# Patient Record
Sex: FEMALE | Race: WHITE | Employment: OTHER | URBAN - METROPOLITAN AREA
[De-identification: names, ages, dates, MRNs, and addresses within clinical notes are randomized per-mention and may not be internally consistent; named-entity substitution may affect disease eponyms.]

---

## 2017-01-23 ENCOUNTER — MED REC SCAN ONLY (OUTPATIENT)
Dept: FAMILY MEDICINE CLINIC | Facility: CLINIC | Age: 67
End: 2017-01-23

## 2017-02-14 ENCOUNTER — TELEPHONE (OUTPATIENT)
Dept: FAMILY MEDICINE CLINIC | Facility: CLINIC | Age: 67
End: 2017-02-14

## 2017-02-14 RX ORDER — RIVASTIGMINE 4.6 MG/24H
1 PATCH, EXTENDED RELEASE TRANSDERMAL DAILY
COMMUNITY
Start: 2010-08-20 | End: 2017-07-05 | Stop reason: ALTCHOICE

## 2017-02-14 RX ORDER — CHLORHEXIDINE GLUCONATE 0.12 MG/ML
1 RINSE ORAL AS DIRECTED
COMMUNITY
Start: 2011-10-18 | End: 2019-02-25

## 2017-02-14 RX ORDER — ESCITALOPRAM OXALATE 20 MG/1
1 TABLET ORAL DAILY
COMMUNITY
Start: 2013-09-20 | End: 2019-08-21

## 2017-02-14 RX ORDER — DIVALPROEX SODIUM 500 MG/1
1 TABLET, DELAYED RELEASE ORAL 2 TIMES DAILY
COMMUNITY
Start: 2012-10-23 | End: 2020-02-11

## 2017-02-14 RX ORDER — LITHIUM CARBONATE 300 MG/1
1 CAPSULE ORAL DAILY
COMMUNITY
Start: 2014-10-22 | End: 2017-07-26

## 2017-02-14 RX ORDER — ACETAMINOPHEN 325 MG/1
2 TABLET ORAL 2 TIMES DAILY
COMMUNITY
Start: 2010-02-03 | End: 2020-05-01

## 2017-02-14 RX ORDER — DIAPER,BRIEF,INFANT-TODD,DISP
1 EACH MISCELLANEOUS AS DIRECTED
COMMUNITY
Start: 2010-08-20 | End: 2020-05-01

## 2017-02-14 RX ORDER — RANITIDINE 150 MG/1
1 TABLET ORAL
COMMUNITY
Start: 2010-02-03 | End: 2018-08-02

## 2017-02-14 RX ORDER — DIAZEPAM 10 MG/1
1 TABLET ORAL AS DIRECTED
COMMUNITY
Start: 2010-02-03 | End: 2017-09-07

## 2017-02-14 RX ORDER — DIVALPROEX SODIUM 250 MG/1
1 TABLET, DELAYED RELEASE ORAL 2 TIMES DAILY
COMMUNITY
Start: 2014-02-18 | End: 2017-07-05 | Stop reason: ALTCHOICE

## 2017-02-14 RX ORDER — GUAIFENESIN 100 MG/5ML
10 SYRUP ORAL AS DIRECTED
COMMUNITY
Start: 2010-02-03 | End: 2020-05-01

## 2017-02-14 RX ORDER — L-DESOXYEPHEDRINE 50 MG
1 INHALER (EA) NASAL 4 TIMES DAILY PRN
COMMUNITY
Start: 2015-10-13 | End: 2020-05-01

## 2017-02-14 NOTE — TELEPHONE ENCOUNTER
Per Liseth De Jesus-  Patient has no appetite/not wanting to eat/weak/spacey/dementia increased. Questions restarting Hospice? Please advise.   Ludy Pineda, 02/14/2017, 11:37 AM

## 2017-02-14 NOTE — TELEPHONE ENCOUNTER
Nisa informed of appt needed. Appt given 11am Wed 12/15 with Dr Marilynn Alcantar for  Hospice Evaluation.   Kali Babin, 02/14/2017, 3:22 PM

## 2017-02-15 NOTE — PROGRESS NOTES
[facility] [place]  PROGRESS NOTE  Chief Complaint:   Patient presents with:  Consult: Hospice Consult      HPI:   This is a 77year old female coming in for consultation regarding hospice care.   The staff at [address] [place] [address] is noted progressive decline for Diarrhea Max 3 Days Disp:  Rfl:    Camphor-Eucalyptus-Menthol (VICKS VAPORUB) 4.7-1.2-2.6 % External Ointment Apply 1 Application topically 4 (four) times daily as needed. Apply thin layer to chest for congestion.  Limit 6 days Disp:  Rfl:    Chlorhexid sneezing, congestion, runny nose or sore throat. INTEGUMENTARY:  Denies rashes, itching, skin lesion, or excessive skin dryness.   CARDIOVASCULAR:  Denies chest pain, chest pressure, chest discomfort, palpitations, edema, dyspnea on exertion or at rest.  R rhythm, no murmurs, rubs or gallops. LUNGS: Clear to auscultation bilterally, no rales/rhonchi/wheezing. CHEST: No tenderness. ABDOMEN: Soft and mildly distended. There is no guarding or rebound tenderness noted. There is no significant mass palpated. any additional medications at this time. Meds & Refills for this Visit:  No prescriptions requested or ordered in this encounter       Patient/Caregiver Education: Patient/Caregiver Education: There are no barriers to learning. Medical education done.

## 2017-02-24 ENCOUNTER — TELEPHONE (OUTPATIENT)
Dept: FAMILY MEDICINE CLINIC | Facility: CLINIC | Age: 67
End: 2017-02-24

## 2017-02-24 NOTE — TELEPHONE ENCOUNTER
Dr Ac Man called back. He wondered about the Lithium for Adrianne because her last level was low. I told him that she is now in Hospice care and declining. I do not recommend increasing her Lithium dose at this time. He agreed.

## 2017-02-28 ENCOUNTER — TELEPHONE (OUTPATIENT)
Dept: FAMILY MEDICINE CLINIC | Facility: CLINIC | Age: 67
End: 2017-02-28

## 2017-03-03 ENCOUNTER — MED REC SCAN ONLY (OUTPATIENT)
Dept: FAMILY MEDICINE CLINIC | Facility: CLINIC | Age: 67
End: 2017-03-03

## 2017-04-18 ENCOUNTER — MED REC SCAN ONLY (OUTPATIENT)
Dept: FAMILY MEDICINE CLINIC | Facility: CLINIC | Age: 67
End: 2017-04-18

## 2017-05-09 ENCOUNTER — MED REC SCAN ONLY (OUTPATIENT)
Dept: FAMILY MEDICINE CLINIC | Facility: CLINIC | Age: 67
End: 2017-05-09

## 2017-05-12 ENCOUNTER — TELEPHONE (OUTPATIENT)
Dept: FAMILY MEDICINE CLINIC | Facility: CLINIC | Age: 67
End: 2017-05-12

## 2017-05-12 NOTE — TELEPHONE ENCOUNTER
Per Tc Vázquez-  Patient has had occasional blood in urine x2 weeks. No U/A done. Otherwise doing well. Please advise.   Gabrielle Britt, 05/12/2017, 11:43 AM

## 2017-05-26 ENCOUNTER — TELEPHONE (OUTPATIENT)
Dept: FAMILY MEDICINE CLINIC | Facility: CLINIC | Age: 67
End: 2017-05-26

## 2017-05-26 NOTE — TELEPHONE ENCOUNTER
[facility] with hospice calling with an update on patient for Dr. Perla Gomez. States patient has always had intermittent bleeding either from her vagina or in her urine but they are unsure which one.   States last week the bleeding was so bad that she actually ble

## 2017-05-26 NOTE — TELEPHONE ENCOUNTER
Nurse Pam Bravo informed of the below recommendations and orders.  Galeana Lipoma, 05/26/2017, 1:20 PM

## 2017-05-31 ENCOUNTER — TELEPHONE (OUTPATIENT)
Dept: FAMILY MEDICINE CLINIC | Facility: CLINIC | Age: 67
End: 2017-05-31

## 2017-05-31 NOTE — TELEPHONE ENCOUNTER
Orders faxed to [address] [place] [address] at [phone] for Hydrocortisone 1% cream.   Orders faxed to [facility] at [phone] for gloves, underpads and incontinent supplies.

## 2017-06-08 ENCOUNTER — PATIENT OUTREACH (OUTPATIENT)
Dept: FAMILY MEDICINE CLINIC | Facility: CLINIC | Age: 67
End: 2017-06-08

## 2017-06-09 ENCOUNTER — TELEPHONE (OUTPATIENT)
Dept: FAMILY MEDICINE CLINIC | Facility: CLINIC | Age: 67
End: 2017-06-09
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
## 2017-06-20 ENCOUNTER — MED REC SCAN ONLY (OUTPATIENT)
Dept: FAMILY MEDICINE CLINIC | Facility: CLINIC | Age: 67
End: 2017-06-20

## 2017-06-29 ENCOUNTER — TELEPHONE (OUTPATIENT)
Dept: FAMILY MEDICINE CLINIC | Facility: CLINIC | Age: 67
End: 2017-06-29

## 2017-06-30 NOTE — TELEPHONE ENCOUNTER
I spoke with Darryl Mazariegos about Adrianne. She continues to have bleeding but is otherwise unchanged. She has cataracts - should she stay in hospice? P:  Will be here for a vist July 5; will discuss this more then.

## 2017-07-05 ENCOUNTER — TELEPHONE (OUTPATIENT)
Dept: FAMILY MEDICINE CLINIC | Facility: CLINIC | Age: 67
End: 2017-07-05

## 2017-07-05 ENCOUNTER — OFFICE VISIT (OUTPATIENT)
Dept: FAMILY MEDICINE CLINIC | Facility: CLINIC | Age: 67
End: 2017-07-05

## 2017-07-05 VITALS — HEART RATE: 50 BPM | TEMPERATURE: 99 F | DIASTOLIC BLOOD PRESSURE: 62 MMHG | SYSTOLIC BLOOD PRESSURE: 108 MMHG

## 2017-07-05 DIAGNOSIS — F72 SEVERE MENTAL RETARDATION: ICD-10-CM

## 2017-07-05 DIAGNOSIS — K64.4 HEMORRHOIDS, EXTERNAL: ICD-10-CM

## 2017-07-05 DIAGNOSIS — R63.4 WEIGHT LOSS: ICD-10-CM

## 2017-07-05 DIAGNOSIS — F02.81 LATE ONSET ALZHEIMER'S DISEASE WITH BEHAVIORAL DISTURBANCE (HCC): ICD-10-CM

## 2017-07-05 DIAGNOSIS — D41.4 NEOPLASM OF UNCERTAIN BEHAVIOR OF BLADDER: ICD-10-CM

## 2017-07-05 DIAGNOSIS — Z90.710 STATUS POST HYSTERECTOMY: ICD-10-CM

## 2017-07-05 DIAGNOSIS — Z00.00 ENCOUNTER FOR ANNUAL HEALTH EXAMINATION: Primary | ICD-10-CM

## 2017-07-05 DIAGNOSIS — G30.1 LATE ONSET ALZHEIMER'S DISEASE WITH BEHAVIORAL DISTURBANCE (HCC): ICD-10-CM

## 2017-07-05 DIAGNOSIS — F31.9 BIPOLAR I DISORDER (HCC): ICD-10-CM

## 2017-07-05 DIAGNOSIS — R56.9 CONVULSIONS, UNSPECIFIED CONVULSION TYPE (HCC): ICD-10-CM

## 2017-07-05 PROCEDURE — 99214 OFFICE O/P EST MOD 30 MIN: CPT | Performed by: FAMILY MEDICINE

## 2017-07-05 PROCEDURE — G0439 PPPS, SUBSEQ VISIT: HCPCS | Performed by: FAMILY MEDICINE

## 2017-07-05 NOTE — TELEPHONE ENCOUNTER
I called and spoke with Suzi Hwang. We will take Adrianne out of Hospice care. She has cataracts - the family will consider surgery for those.

## 2017-07-05 NOTE — PROGRESS NOTES
[facility] [place]  PROGRESS NOTE  Chief Complaint:   Patient presents with:  Physical      HPI:   This is a 77year old female coming in for annual Medicare wellness exam.    She has been receiving hospice care because of her declining conditio Camphor-Eucalyptus-Menthol (VICKS VAPORUB) 4.7-1.2-2.6 % External Ointment Apply 1 Application topically 4 (four) times daily as needed. Apply thin layer to chest for congestion.  Limit 6 days Disp:  Rfl:    Chlorhexidine Gluconate (PERIDEX) 0.12 % Mouth/ at rest.  RESPIRATORY:  Denies shortness of breath, wheezing, cough or sputum. GASTROINTESTINAL:  Denies abdominal pain, nausea, vomiting, constipation, diarrhea, or blood in stool.   MUSCULOSKELETAL:  Denies weakness, muscle aches, back pain, joint pain, quadrants, no masses, no hepatosplenomegaly. Pelvic: External genitalia are normal.  A very gentle single finger exam was done of the vagina. No masses were noted. There is no blood noted.   Rectal: She has multiple large external hemorrhoids that are no with the dementia. Meds & Refills for this Visit:  No prescriptions requested or ordered in this encounter       Patient/Caregiver Education: Patient/Caregiver Education: There are no barriers to learning. Medical education done.    Outcome: Patient ve

## 2017-07-05 NOTE — PATIENT INSTRUCTIONS
Pelvic exam performed today. Adrianne does not have an obvious internal vaginal source of bleeding. She does not have an obvious mass in her pelvis. She does have external hemorrhoids.     Plan:  I recommend that we discontinue hospice services at this t

## 2017-07-12 ENCOUNTER — MED REC SCAN ONLY (OUTPATIENT)
Dept: FAMILY MEDICINE CLINIC | Facility: CLINIC | Age: 67
End: 2017-07-12

## 2017-07-24 ENCOUNTER — TELEPHONE (OUTPATIENT)
Dept: FAMILY MEDICINE CLINIC | Facility: CLINIC | Age: 67
End: 2017-07-24

## 2017-07-24 NOTE — TELEPHONE ENCOUNTER
New Rx sent in for Hydrocortisone cream - apply bid as needed for redness, itching, or inflammation.

## 2017-07-24 NOTE — TELEPHONE ENCOUNTER
Fax asking if something should be done for Paitent's external hemmorhoids? Staff has concerns. Please advise.

## 2017-07-26 ENCOUNTER — TELEPHONE (OUTPATIENT)
Dept: FAMILY MEDICINE CLINIC | Facility: CLINIC | Age: 67
End: 2017-07-26

## 2017-07-26 DIAGNOSIS — E05.90 HYPERTHYROIDISM: Primary | ICD-10-CM

## 2017-07-26 NOTE — TELEPHONE ENCOUNTER
Please call Ritesh Recio. Adrianne's labs are abnormal.   Her TSH level is very low. This is probably caused by her Lithium. Please discontinue her Lithium and do the additional labs that have been ordered.   She may need some other tests depending on these n

## 2017-08-08 LAB — AMB EXT TSH: 0.01 MIU/ML

## 2017-08-09 ENCOUNTER — OFFICE VISIT (OUTPATIENT)
Dept: FAMILY MEDICINE CLINIC | Facility: CLINIC | Age: 67
End: 2017-08-09

## 2017-08-09 VITALS
DIASTOLIC BLOOD PRESSURE: 70 MMHG | HEIGHT: 63 IN | TEMPERATURE: 98 F | SYSTOLIC BLOOD PRESSURE: 128 MMHG | RESPIRATION RATE: 16 BRPM | WEIGHT: 132 LBS | BODY MASS INDEX: 23.39 KG/M2 | HEART RATE: 72 BPM

## 2017-08-09 DIAGNOSIS — F41.1 GENERALIZED ANXIETY DISORDER: ICD-10-CM

## 2017-08-09 DIAGNOSIS — F72 SEVERE MENTAL RETARDATION: ICD-10-CM

## 2017-08-09 DIAGNOSIS — R56.9 CONVULSIONS, UNSPECIFIED CONVULSION TYPE (HCC): ICD-10-CM

## 2017-08-09 DIAGNOSIS — F60.3 EXPLOSIVE PERSONALITY DISORDER (HCC): ICD-10-CM

## 2017-08-09 DIAGNOSIS — H25.813 COMBINED FORMS OF AGE-RELATED CATARACT OF BOTH EYES: Primary | ICD-10-CM

## 2017-08-09 DIAGNOSIS — L28.0 LICHENIFICATION AND LICHEN SIMPLEX CHRONICUS: ICD-10-CM

## 2017-08-09 DIAGNOSIS — G30.1 LATE ONSET ALZHEIMER'S DISEASE WITH BEHAVIORAL DISTURBANCE (HCC): ICD-10-CM

## 2017-08-09 DIAGNOSIS — Z01.818 PREOP EXAMINATION: ICD-10-CM

## 2017-08-09 DIAGNOSIS — F02.81 LATE ONSET ALZHEIMER'S DISEASE WITH BEHAVIORAL DISTURBANCE (HCC): ICD-10-CM

## 2017-08-09 PROCEDURE — 93000 ELECTROCARDIOGRAM COMPLETE: CPT | Performed by: FAMILY MEDICINE

## 2017-08-09 PROCEDURE — 99214 OFFICE O/P EST MOD 30 MIN: CPT | Performed by: FAMILY MEDICINE

## 2017-08-09 NOTE — PROGRESS NOTES
[facility] [place]  PROGRESS NOTE  Chief Complaint:   Patient presents with:  Physical: Annual and PreOp      HPI:   This is a 79year old female coming in for a preoperative examination for cataract surgery.   This is also her Medicare annual we daily. For Minor Pain/Fever above 100(Max 3 days.)  Disp:  Rfl:    bismuth subsalicylate (BISMATROL) 262 MG/15ML Oral Suspension Take 30 mL by mouth As Directed. Q4hrs/prn for Diarrhea Max 3 Days Disp:  Rfl:    Camphor-Eucalyptus-Menthol (VICKS VAPORUB) 4. excoriated areas on her face and on her legs. CARDIOVASCULAR:  Denies chest pain, chest pressure, chest discomfort, palpitations, edema, dyspnea on exertion or at rest.  RESPIRATORY:  Denies shortness of breath, wheezing, cough or sputum.   Keshia Euceda rales/rhonchi/wheezing. ABDOMEN:  Soft, nondistended, nontender, bowel sounds normal in all 4 quadrants, no masses, no hepatosplenomegaly. EXTREMITIES:  No edema, no cyanosis, no clubbing, FROM, 2+ dorsalis pedis pulses bilaterally.   NEURO: No focal defi ordered in this encounter      Patient/Caregiver Education: Patient/Caregiver Education: There are no barriers to learning. Medical education done. Outcome: Patient verbalizes understanding.  Patient is notified to call with any questions, complications,

## 2017-08-09 NOTE — PATIENT INSTRUCTIONS
Medically clear for cataract surgery. She has progressively more anxiety as evidenced by outbursts and scratching.   However, the medications we have used for this in the past have caused significant side effects (Risperdal - sleepiness, Lithium - thyroid

## 2017-08-14 ENCOUNTER — TELEPHONE (OUTPATIENT)
Dept: FAMILY MEDICINE CLINIC | Facility: CLINIC | Age: 67
End: 2017-08-14

## 2017-08-14 NOTE — TELEPHONE ENCOUNTER
Please advise fax received from [address] [place] [address].   Marcella Schneider, 08/14/17, 9:18 AM

## 2017-08-14 NOTE — TELEPHONE ENCOUNTER
Tamara's anxiety seems to be increasing now that she is off lithium. Now that she is no longer receiving hospice care she is eligible to go back to seeing the psychiatrist.  The request of the staff is that MARKND start seeing the psychiatrist again.   I

## 2017-08-16 ENCOUNTER — TELEPHONE (OUTPATIENT)
Dept: FAMILY MEDICINE CLINIC | Facility: CLINIC | Age: 67
End: 2017-08-16

## 2017-08-23 ENCOUNTER — TELEPHONE (OUTPATIENT)
Dept: FAMILY MEDICINE CLINIC | Facility: CLINIC | Age: 67
End: 2017-08-23

## 2017-08-23 DIAGNOSIS — E05.90 HYPERTHYROIDISM: Primary | ICD-10-CM

## 2017-08-23 NOTE — TELEPHONE ENCOUNTER
Labs reviewed. Her TSH is low at 0.01. However her free T4 is normal at 1.22. Her TPO antibody is normal 1.3. Her thyroid globulin antibody is normal at 14. This is consistent with an elevated TSH associated with lithium.   However since her free T4 is

## 2017-08-29 ENCOUNTER — TELEPHONE (OUTPATIENT)
Dept: FAMILY MEDICINE CLINIC | Facility: CLINIC | Age: 67
End: 2017-08-29

## 2017-08-29 NOTE — TELEPHONE ENCOUNTER
Adrianne had her cataract surgery and did very well. She is scheduled to have the other eye done on September 11. She needs a note stating that she remains medically clear for surgery. She is not having any signs or symptoms of infection.     This serves

## 2017-08-30 ENCOUNTER — TELEPHONE (OUTPATIENT)
Dept: FAMILY MEDICINE CLINIC | Facility: CLINIC | Age: 67
End: 2017-08-30

## 2017-08-30 NOTE — TELEPHONE ENCOUNTER
Pt had pre op px cataract on 8/9/17 but second eye surgery was not scheduled. Pt is now having the second cataract done on 9/11 but px is 2 days over the 30 day recommended time for pre op px.  Does pt need another px or can you amended the original px so p

## 2017-08-30 NOTE — TELEPHONE ENCOUNTER
H and P is not within the 30 day time frame that it needs to be- suggesting nurse to call [facility] to see what kind of addendum needs to be done in order for them to accept this- person to contact is [address] at [phone]

## 2017-08-30 NOTE — TELEPHONE ENCOUNTER
Please see phone note from yesterday. Note from yesterday contains addendum to original H & P; no new visit needed.

## 2017-09-05 ENCOUNTER — TELEPHONE (OUTPATIENT)
Dept: FAMILY MEDICINE CLINIC | Facility: CLINIC | Age: 67
End: 2017-09-05

## 2017-09-05 NOTE — TELEPHONE ENCOUNTER
Has Fx wrist and seeing Dr [facility]. Having Eye surgery on 9/11/17 and needs clearance.

## 2017-09-06 NOTE — TELEPHONE ENCOUNTER
Per Carri Session-  Needing addendum to H&P for Cataract Surgery. Since office visit with Dr Deonte Sultana Patient has left wrist fracture. Casted Today by Dr Kate Willett. Patient also has skin breakdown to bottom. Needing evaluation to clear for surgery.     Kirsty

## 2017-09-07 ENCOUNTER — TELEPHONE (OUTPATIENT)
Dept: FAMILY MEDICINE CLINIC | Facility: CLINIC | Age: 67
End: 2017-09-07

## 2017-09-07 ENCOUNTER — OFFICE VISIT (OUTPATIENT)
Dept: FAMILY MEDICINE CLINIC | Facility: CLINIC | Age: 67
End: 2017-09-07

## 2017-09-07 VITALS
DIASTOLIC BLOOD PRESSURE: 72 MMHG | HEART RATE: 68 BPM | SYSTOLIC BLOOD PRESSURE: 110 MMHG | TEMPERATURE: 99 F | WEIGHT: 131.25 LBS | RESPIRATION RATE: 18 BRPM | BODY MASS INDEX: 23 KG/M2

## 2017-09-07 DIAGNOSIS — Z01.818 PREOPERATIVE EXAMINATION: ICD-10-CM

## 2017-09-07 DIAGNOSIS — H25.9 SENILE CATARACT OF LEFT EYE, UNSPECIFIED AGE-RELATED CATARACT TYPE: ICD-10-CM

## 2017-09-07 DIAGNOSIS — L89.151 DECUBITUS ULCER OF SACRAL REGION, STAGE 1: Primary | ICD-10-CM

## 2017-09-07 PROCEDURE — 99213 OFFICE O/P EST LOW 20 MIN: CPT | Performed by: FAMILY MEDICINE

## 2017-09-07 NOTE — PATIENT INSTRUCTIONS
Recommend to apply ointment twice a day for 4 weeks. Return to clinic if any increase redness, pain, warmth, fever or oozing. May proceed with cataract procedure. She does not have any contraindication for procedure. EKG was done last month.    Recheck

## 2017-09-07 NOTE — PROGRESS NOTES
[address]  PROGRESS NOTE  Chief Complaint:   Patient presents with:  Lesion: breakdown on buttocks, open sore, about 2 weeks ago, vaseline.    Surgical/procedure Clearance: needs clearance loulou carmen cataract procedure on 9/11/17 Apply to Perineum Area Every Night before putting diaper on. Disp:  Rfl:    acetaminophen 325 MG Oral Tab Take 650 mg by mouth every 6 (six) hours as needed for Pain or Fever (Pain or Fever >100).  Disp:  Rfl:    acetaminophen 325 MG Oral Tab Take 2 tablets nose or sore throat. INTEGUMENTARY: See HPI  CARDIOVASCULAR:  Denies chest pain, chest pressure, chest discomfort, palpitations, edema, dyspnea on exertion or at rest.  RESPIRATORY:  Denies shortness of breath, wheezing, cough or sputum.   GASTROINTESTINAL twice a day for 4 weeks. Return to clinic if any increase redness, pain, warmth, fever or oozing. May proceed with cataract procedure. She does not have any contraindication for procedure. EKG was done last month.    Recheck in 2 weeks with Dr Lorie Pendleton

## 2017-09-19 ENCOUNTER — TELEPHONE (OUTPATIENT)
Dept: FAMILY MEDICINE CLINIC | Facility: CLINIC | Age: 67
End: 2017-09-19

## 2017-09-19 DIAGNOSIS — R56.9 CONVULSIONS, UNSPECIFIED CONVULSION TYPE (HCC): ICD-10-CM

## 2017-09-19 DIAGNOSIS — R53.81 GENERAL BODY DETERIORATION: Primary | ICD-10-CM

## 2017-09-19 DIAGNOSIS — F02.81 LATE ONSET ALZHEIMER'S DISEASE WITH BEHAVIORAL DISTURBANCE (HCC): ICD-10-CM

## 2017-09-19 DIAGNOSIS — R53.1 WEAKNESS: ICD-10-CM

## 2017-09-19 DIAGNOSIS — G30.1 LATE ONSET ALZHEIMER'S DISEASE WITH BEHAVIORAL DISTURBANCE (HCC): ICD-10-CM

## 2017-09-20 NOTE — TELEPHONE ENCOUNTER
Good news. Adrianne's thyroid is coming back to normal.  No new medications now. Recheck her TSH in 3 months.

## 2017-09-20 NOTE — TELEPHONE ENCOUNTER
Information faxed to [address] [place] [address].   [facility], 09/20/17, 5:20 PM

## 2017-09-22 ENCOUNTER — OFFICE VISIT (OUTPATIENT)
Dept: FAMILY MEDICINE CLINIC | Facility: CLINIC | Age: 67
End: 2017-09-22

## 2017-09-22 ENCOUNTER — MED REC SCAN ONLY (OUTPATIENT)
Dept: FAMILY MEDICINE CLINIC | Facility: CLINIC | Age: 67
End: 2017-09-22

## 2017-09-22 VITALS
SYSTOLIC BLOOD PRESSURE: 120 MMHG | HEIGHT: 63 IN | HEART RATE: 62 BPM | DIASTOLIC BLOOD PRESSURE: 74 MMHG | TEMPERATURE: 98 F | BODY MASS INDEX: 23.21 KG/M2 | WEIGHT: 131 LBS

## 2017-09-22 DIAGNOSIS — L89.152 DECUBITUS ULCER OF SACRAL REGION, STAGE 2 (HCC): Primary | ICD-10-CM

## 2017-09-22 PROBLEM — Z01.818 PREOP EXAMINATION: Status: RESOLVED | Noted: 2017-08-09 | Resolved: 2017-09-22

## 2017-09-22 PROCEDURE — 99213 OFFICE O/P EST LOW 20 MIN: CPT | Performed by: FAMILY MEDICINE

## 2017-09-22 RX ORDER — DIFLUPREDNATE 0.5 MG/ML
1 EMULSION OPHTHALMIC 3 TIMES DAILY
COMMUNITY
End: 2018-08-14 | Stop reason: ALTCHOICE

## 2017-09-22 NOTE — PROGRESS NOTES
[address]  PROGRESS NOTE  Chief Complaint:   Patient presents with: Follow - Up      HPI:   This is a 79year old female coming in for follow-up on the sore on her bottom.     Miranda Tay seems to be a new woman with the change in her medic External Ointment Apply 1 Application topically nightly. Apply to Perineum Area Every Night before putting diaper on. Disp:  Rfl:    acetaminophen 325 MG Oral Tab Take 650 mg by mouth every 6 (six) hours as needed for Pain or Fever (Pain or Fever >100).  Leopold Martens seems to have much better vision since her cataract surgery. She now looks at people and seems to have much more eye contact with faces. Ears, Nose, Throat:  Denies hearing loss, sneezing, congestion, runny nose or sore throat.   INTEGUMENTARY: The staff Throat:  No tonsillar erythema or exudate. Mouth:  No oral lesions or ulcerations, good dentition. NECK: Supple, no CLAD, no JVD, no thyromegaly. SKIN: She has stage I decubitus on her right sacral area just to the right to the midline.   It is not open Convulsions ([facility] [address]     General body deterioration     Weight loss     Hemorrhoids, external     Decubitus ulcer of sacral region, stage 2      Klever Weldon MD  9/22/2017  3:12 PM

## 2017-10-11 ENCOUNTER — MED REC SCAN ONLY (OUTPATIENT)
Dept: FAMILY MEDICINE CLINIC | Facility: CLINIC | Age: 67
End: 2017-10-11

## 2017-12-12 ENCOUNTER — MED REC SCAN ONLY (OUTPATIENT)
Dept: FAMILY MEDICINE CLINIC | Facility: CLINIC | Age: 67
End: 2017-12-12

## 2017-12-16 NOTE — PROGRESS NOTES
This note is an addendum to the progress note of 9/22/2017. This addendum is added on December 16, 2017. Adrianne has a medical condition which requires positioning of the body in ways not feasible with an ordinary bed.   She has a decubitus ulcer on he

## 2018-01-02 ENCOUNTER — TELEPHONE (OUTPATIENT)
Dept: FAMILY MEDICINE CLINIC | Facility: CLINIC | Age: 68
End: 2018-01-02

## 2018-01-05 ENCOUNTER — TELEPHONE (OUTPATIENT)
Dept: FAMILY MEDICINE CLINIC | Facility: CLINIC | Age: 68
End: 2018-01-05

## 2018-01-05 NOTE — TELEPHONE ENCOUNTER
Left detailed message for Ab Burton with [facility]. Asked Anne Carson to call back with any furthering questions/concerns.

## 2018-05-09 ENCOUNTER — MED REC SCAN ONLY (OUTPATIENT)
Dept: FAMILY MEDICINE CLINIC | Facility: CLINIC | Age: 68
End: 2018-05-09

## 2018-05-09 ENCOUNTER — TELEPHONE (OUTPATIENT)
Dept: FAMILY MEDICINE CLINIC | Facility: CLINIC | Age: 68
End: 2018-05-09

## 2018-05-09 NOTE — TELEPHONE ENCOUNTER
Copy of DNR form faxed to Sonali Ladnis at [facility]. Mesha Corbin did call back to confirm fax. She will have POA sign new form.   Lucrecia Luna, 05/09/18, 5:56 PM

## 2018-06-01 ENCOUNTER — MED REC SCAN ONLY (OUTPATIENT)
Dept: FAMILY MEDICINE CLINIC | Facility: CLINIC | Age: 68
End: 2018-06-01

## 2018-08-02 NOTE — TELEPHONE ENCOUNTER
Future appt:     Your appointments     Date & Time Appointment Department [facility])    Aug 14, 2018  9:00 AM CDT Medicare Annual Well Visit with Chester Moran MD [address] [place] [address]

## 2018-08-03 RX ORDER — RANITIDINE 150 MG/1
TABLET ORAL
Qty: 31 TABLET | Refills: 11 | Status: SHIPPED | OUTPATIENT
Start: 2018-08-03 | End: 2020-02-11

## 2018-08-14 ENCOUNTER — OFFICE VISIT (OUTPATIENT)
Dept: FAMILY MEDICINE CLINIC | Facility: CLINIC | Age: 68
End: 2018-08-14
Payer: MEDICARE

## 2018-08-14 VITALS
SYSTOLIC BLOOD PRESSURE: 110 MMHG | RESPIRATION RATE: 18 BRPM | BODY MASS INDEX: 25.23 KG/M2 | HEART RATE: 82 BPM | WEIGHT: 142.38 LBS | HEIGHT: 63 IN | TEMPERATURE: 97 F | DIASTOLIC BLOOD PRESSURE: 62 MMHG

## 2018-08-14 DIAGNOSIS — F41.1 GENERALIZED ANXIETY DISORDER: ICD-10-CM

## 2018-08-14 DIAGNOSIS — I48.3 TYPICAL ATRIAL FLUTTER (HCC): ICD-10-CM

## 2018-08-14 DIAGNOSIS — F31.9 BIPOLAR I DISORDER (HCC): ICD-10-CM

## 2018-08-14 DIAGNOSIS — G30.1 LATE ONSET ALZHEIMER'S DISEASE WITH BEHAVIORAL DISTURBANCE (HCC): ICD-10-CM

## 2018-08-14 DIAGNOSIS — L28.0 LICHENIFICATION AND LICHEN SIMPLEX CHRONICUS: ICD-10-CM

## 2018-08-14 DIAGNOSIS — N32.89 BLADDER MASS: ICD-10-CM

## 2018-08-14 DIAGNOSIS — F72 SEVERE MENTAL RETARDATION: ICD-10-CM

## 2018-08-14 DIAGNOSIS — Z90.710 STATUS POST HYSTERECTOMY: ICD-10-CM

## 2018-08-14 DIAGNOSIS — F60.3 EXPLOSIVE PERSONALITY DISORDER (HCC): ICD-10-CM

## 2018-08-14 DIAGNOSIS — Z00.00 ENCOUNTER FOR ANNUAL HEALTH EXAMINATION: Primary | ICD-10-CM

## 2018-08-14 DIAGNOSIS — R13.19 OTHER DYSPHAGIA: ICD-10-CM

## 2018-08-14 DIAGNOSIS — F02.81 LATE ONSET ALZHEIMER'S DISEASE WITH BEHAVIORAL DISTURBANCE (HCC): ICD-10-CM

## 2018-08-14 DIAGNOSIS — R62.50 DEVELOPMENTAL DELAY: ICD-10-CM

## 2018-08-14 DIAGNOSIS — K02.9 DENTAL CARIES: ICD-10-CM

## 2018-08-14 DIAGNOSIS — N94.89 ADNEXAL MASS: ICD-10-CM

## 2018-08-14 PROCEDURE — 99214 OFFICE O/P EST MOD 30 MIN: CPT | Performed by: FAMILY MEDICINE

## 2018-08-14 PROCEDURE — G0439 PPPS, SUBSEQ VISIT: HCPCS | Performed by: FAMILY MEDICINE

## 2018-08-14 RX ORDER — QUETIAPINE 300 MG/1
300 TABLET, FILM COATED ORAL NIGHTLY
COMMUNITY

## 2018-08-14 RX ORDER — ESCITALOPRAM OXALATE 10 MG/1
10 TABLET ORAL DAILY
COMMUNITY
End: 2019-08-21

## 2018-08-14 NOTE — PROGRESS NOTES
[facility] [place]  PROGRESS NOTE  Chief Complaint:   Patient presents with:  Physical      HPI:   This is a 76year old female coming in for her annual Medicare wellness exam.    Donel Lipoma was diagnosed with an adnexal mass highly suspicious for status: Never Smoker                                                              Smokeless tobacco: Never Used                      Alcohol use: No              Family History:  No family history on file.   Allergies:    Lithium                 OTHER (SEE Sodium (DEPAKOTE) 500 MG Oral Tab EC DR tab Take 1 tablet by mouth 2 (two) times daily. Total Dose (750mg)BID Disp:  Rfl:    escitalopram (LEXAPRO) 20 MG Oral Tab Take 1 tablet by mouth daily.  Disp:  Rfl:    guaiFENesin (GUIATUSS) 100 MG/5ML Oral Syrup [facility] time.  ENDOCRINOLOGIC:  Denies excessive sweating, cold or heat intolerance, polyuria or polydipsia. ALLERGIES:  Denies allergic response, history of asthma, sneezing, hives, eczema or rhinitis.    Genitourinary: She does occasionally have blood in the uri does wear pull-ups. External genitalia are slightly reddened but no obvious sores noted. BACK: No tenderness, no spasm, SLR test negative, FROM. EXTREMITIES:  No edema, no cyanosis, no clubbing, FROM, 2+ dorsalis pedis pulses bilaterally.   NEURO:  No de was not ovarian cancer.  - OFFICE/OUTPT VISIT,EST,LEVL IV    8. Bladder mass  She had a history of a bladder mass that was causing hematuria.   She continues to have the hematuria episode is likely that the bladder mass remains in place.  - OFFICE/OUTPT VIS external     Decubitus ulcer of sacral region, stage 2     Adnexal mass     Bladder mass     Developmental delay     Pelvic mass     Dental caries      Lio Jean-Baptiste MD  8/14/2018  9:32 AM

## 2018-08-14 NOTE — PATIENT INSTRUCTIONS
Please contact us to order a swallow evaluation after the dental work is completed. No changes in medications today. Recommended Websites for Advanced Directives    [email or web address]  An information packet, inc

## 2018-09-05 ENCOUNTER — MED REC SCAN ONLY (OUTPATIENT)
Dept: FAMILY MEDICINE CLINIC | Facility: CLINIC | Age: 68
End: 2018-09-05

## 2018-10-26 ENCOUNTER — TELEPHONE (OUTPATIENT)
Dept: FAMILY MEDICINE CLINIC | Facility: CLINIC | Age: 68
End: 2018-10-26

## 2018-10-29 NOTE — TELEPHONE ENCOUNTER
Adrianne was seen in the emergency room following a fall. She had sutures on her forehead. The staff was able to successfully remove her sutures. No additional follow-up is needed at this time.

## 2019-02-15 ENCOUNTER — TELEPHONE (OUTPATIENT)
Dept: FAMILY MEDICINE CLINIC | Facility: CLINIC | Age: 69
End: 2019-02-15

## 2019-02-15 DIAGNOSIS — R30.0 DYSURIA: Primary | ICD-10-CM

## 2019-02-22 LAB
BILIRUB UR QL STRIP.AUTO: NEGATIVE
GLUCOSE UR STRIP.AUTO-MCNC: NEGATIVE MG/DL
NITRITE UR QL STRIP.AUTO: POSITIVE
PH UR STRIP.AUTO: 5 [PH] (ref 4.5–8)
PROT UR STRIP.AUTO-MCNC: 100 MG/DL
RBC #/AREA URNS AUTO: >10 /HPF
SP GR UR STRIP.AUTO: 1.02 (ref 1–1.03)
UROBILINOGEN UR STRIP.AUTO-MCNC: <2 MG/DL
WBC #/AREA URNS AUTO: >50 /HPF

## 2019-02-22 PROCEDURE — 87186 SC STD MICRODIL/AGAR DIL: CPT | Performed by: FAMILY MEDICINE

## 2019-02-22 PROCEDURE — 87077 CULTURE AEROBIC IDENTIFY: CPT | Performed by: FAMILY MEDICINE

## 2019-02-22 PROCEDURE — 81001 URINALYSIS AUTO W/SCOPE: CPT | Performed by: FAMILY MEDICINE

## 2019-02-22 PROCEDURE — 87086 URINE CULTURE/COLONY COUNT: CPT | Performed by: FAMILY MEDICINE

## 2019-02-25 ENCOUNTER — TELEPHONE (OUTPATIENT)
Dept: FAMILY MEDICINE CLINIC | Facility: CLINIC | Age: 69
End: 2019-02-25

## 2019-02-25 PROBLEM — N30.01 ACUTE CYSTITIS WITH HEMATURIA: Status: ACTIVE | Noted: 2019-02-25

## 2019-02-25 RX ORDER — AMOXICILLIN 500 MG/1
500 CAPSULE ORAL 3 TIMES DAILY
Qty: 30 CAPSULE | Refills: 0 | Status: SHIPPED | OUTPATIENT
Start: 2019-02-25 | End: 2019-02-25 | Stop reason: ALTCHOICE

## 2019-02-25 NOTE — PATIENT INSTRUCTIONS
Adrianne was seen in the ED and started on Keflex. This is appropriate for treatment of her UTI based on the culture. Please complete the course of treatment.

## 2019-02-25 NOTE — TELEPHONE ENCOUNTER
Abby Ignacio does have a urinary tract infection. It seems to be sensitive to amoxicillin. I will send in a prescription for amoxicillin 500 mg 3 times a day for 10 days.

## 2019-02-25 NOTE — TELEPHONE ENCOUNTER
This note was created before it became apparent that she had been seen in the emergency department and was started on oral Keflex. In view of the sensitivities of the urine oral Keflex is acceptable for treating her UTI.   The prescription for amoxicillin

## 2019-02-25 NOTE — PROGRESS NOTES
[address] [place] [address]  PROGRESS NOTE  Chief Complaint:   Patient presents with: Follow - Up: 6month      HPI:   This is a 76year old female coming in for her 6-month follow-up appointment. Isaiah Amaya is unable to provide the history.   She is nonv Nitrite Urine Positive (A) Negative    Leukocyte Esterase Urine Trace (A) Negative    WBC Urine >50 (A) <5 /HPF    RBC URINE >10 (A) 0 - 2 /HPF    Bacteria Urine 1+ (A) None Seen    Squamous Epi.  Cells None Seen Small /LPF    Renal Tubular Epithelial Ce daily. Disp:  Rfl:    QUEtiapine Fumarate 300 MG Oral Tab Take 300 mg by mouth nightly. Disp:  Rfl:    RANITIDINE  MG Oral Tab TAKE 1 TABLET BY MOUTH AT BEDTIME (DX: ESOPHAGITIS) Disp: 31 tablet Rfl: 11   Misc.  Devices (GEL-FOAM CUSHION) Does not ap OF SYSTEMS:   CONSTITUTIONAL:  Denies unusual weight gain/loss, fever, chills, or fatigue. EENT:  Eyes:  Denies eye pain, visual loss, blurred vision, double vision or yellow sclerae.  Ears, Nose, Throat:  Denies hearing loss, sneezing, congestion, runny n directions appropriately. She is able to walk although her gait is slightly unsteady and she usually does require a little bit of assistance. HEENT:  Head:  Normocephalic, atraumatic Eyes: EOMI, PERRLA, large bruise on the right temple area.   It does not metabolism     Esophagitis     Status post hysterectomy     Encounter for long-term (current) drug use     Neoplasm of uncertain behavior of bladder     Lichenification and lichen simplex chronicus     Noninflammatory disorder of ovary, fallopian tube, and

## 2019-03-04 ENCOUNTER — TELEPHONE (OUTPATIENT)
Dept: FAMILY MEDICINE CLINIC | Facility: CLINIC | Age: 69
End: 2019-03-04
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
## 2019-04-24 ENCOUNTER — MED REC SCAN ONLY (OUTPATIENT)
Dept: FAMILY MEDICINE CLINIC | Facility: CLINIC | Age: 69
End: 2019-04-24

## 2019-06-07 ENCOUNTER — MED REC SCAN ONLY (OUTPATIENT)
Dept: FAMILY MEDICINE CLINIC | Facility: CLINIC | Age: 69
End: 2019-06-07

## 2019-06-14 ENCOUNTER — TELEPHONE (OUTPATIENT)
Dept: FAMILY MEDICINE CLINIC | Facility: CLINIC | Age: 69
End: 2019-06-14

## 2019-07-01 ENCOUNTER — TELEPHONE (OUTPATIENT)
Dept: FAMILY MEDICINE CLINIC | Facility: CLINIC | Age: 69
End: 2019-07-01
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
## 2019-08-21 ENCOUNTER — TELEPHONE (OUTPATIENT)
Dept: FAMILY MEDICINE CLINIC | Facility: CLINIC | Age: 69
End: 2019-08-21

## 2019-08-21 ENCOUNTER — APPOINTMENT (OUTPATIENT)
Dept: LAB | Age: 69
End: 2019-08-21
Attending: FAMILY MEDICINE
Payer: MEDICARE

## 2019-08-21 ENCOUNTER — OFFICE VISIT (OUTPATIENT)
Dept: FAMILY MEDICINE CLINIC | Facility: CLINIC | Age: 69
End: 2019-08-21
Payer: MEDICARE

## 2019-08-21 VITALS
TEMPERATURE: 99 F | WEIGHT: 144 LBS | DIASTOLIC BLOOD PRESSURE: 82 MMHG | SYSTOLIC BLOOD PRESSURE: 136 MMHG | OXYGEN SATURATION: 97 % | RESPIRATION RATE: 16 BRPM | HEART RATE: 102 BPM | BODY MASS INDEX: 25.52 KG/M2 | HEIGHT: 63 IN

## 2019-08-21 DIAGNOSIS — F31.9 BIPOLAR I DISORDER (HCC): ICD-10-CM

## 2019-08-21 DIAGNOSIS — G30.1 LATE ONSET ALZHEIMER'S DISEASE WITH BEHAVIORAL DISTURBANCE (HCC): ICD-10-CM

## 2019-08-21 DIAGNOSIS — Z12.11 SCREENING FOR COLON CANCER: ICD-10-CM

## 2019-08-21 DIAGNOSIS — N94.89 ADNEXAL MASS: ICD-10-CM

## 2019-08-21 DIAGNOSIS — F60.3 EXPLOSIVE PERSONALITY DISORDER (HCC): ICD-10-CM

## 2019-08-21 DIAGNOSIS — Z11.59 NEED FOR HEPATITIS C SCREENING TEST: ICD-10-CM

## 2019-08-21 DIAGNOSIS — F02.81 LATE ONSET ALZHEIMER'S DISEASE WITH BEHAVIORAL DISTURBANCE (HCC): ICD-10-CM

## 2019-08-21 DIAGNOSIS — F41.1 GENERALIZED ANXIETY DISORDER: ICD-10-CM

## 2019-08-21 DIAGNOSIS — R62.50 DEVELOPMENTAL DELAY: ICD-10-CM

## 2019-08-21 DIAGNOSIS — R56.9 CONVULSIONS, UNSPECIFIED CONVULSION TYPE (HCC): ICD-10-CM

## 2019-08-21 DIAGNOSIS — Z13.6 SCREENING FOR CARDIOVASCULAR CONDITION: ICD-10-CM

## 2019-08-21 DIAGNOSIS — F72 SEVERE INTELLECTUAL DISABILITY: ICD-10-CM

## 2019-08-21 DIAGNOSIS — D41.4 NEOPLASM OF UNCERTAIN BEHAVIOR OF BLADDER: ICD-10-CM

## 2019-08-21 DIAGNOSIS — K02.9 DENTAL CARIES: ICD-10-CM

## 2019-08-21 DIAGNOSIS — Z00.00 ENCOUNTER FOR ANNUAL HEALTH EXAMINATION: Primary | ICD-10-CM

## 2019-08-21 LAB
ALBUMIN SERPL-MCNC: 3.5 G/DL (ref 3.4–5)
ALBUMIN/GLOB SERPL: 0.8 {RATIO} (ref 1–2)
ALP LIVER SERPL-CCNC: 114 U/L (ref 55–142)
ALT SERPL-CCNC: 16 U/L (ref 13–56)
ANION GAP SERPL CALC-SCNC: 8 MMOL/L (ref 0–18)
AST SERPL-CCNC: 13 U/L (ref 15–37)
BASOPHILS # BLD AUTO: 0.03 X10(3) UL (ref 0–0.2)
BASOPHILS NFR BLD AUTO: 0.5 %
BILIRUB SERPL-MCNC: 0.2 MG/DL (ref 0.1–2)
BUN BLD-MCNC: 25 MG/DL (ref 7–18)
BUN/CREAT SERPL: 29.1 (ref 10–20)
CALCIUM BLD-MCNC: 9.6 MG/DL (ref 8.5–10.1)
CHLORIDE SERPL-SCNC: 109 MMOL/L (ref 98–112)
CHOLEST SMN-MCNC: 216 MG/DL (ref ?–200)
CO2 SERPL-SCNC: 27 MMOL/L (ref 21–32)
CREAT BLD-MCNC: 0.86 MG/DL (ref 0.55–1.02)
DEPRECATED RDW RBC AUTO: 49.1 FL (ref 35.1–46.3)
EOSINOPHIL # BLD AUTO: 0.03 X10(3) UL (ref 0–0.7)
EOSINOPHIL NFR BLD AUTO: 0.5 %
ERYTHROCYTE [DISTWIDTH] IN BLOOD BY AUTOMATED COUNT: 16.9 % (ref 11–15)
GLOBULIN PLAS-MCNC: 4.2 G/DL (ref 2.8–4.4)
GLUCOSE BLD-MCNC: 110 MG/DL (ref 70–99)
HCT VFR BLD AUTO: 35.9 % (ref 35–48)
HCV AB SERPL QL IA: NONREACTIVE
HDLC SERPL-MCNC: 47 MG/DL (ref 40–59)
HGB BLD-MCNC: 10.2 G/DL (ref 12–16)
IMM GRANULOCYTES # BLD AUTO: 0.01 X10(3) UL (ref 0–1)
IMM GRANULOCYTES NFR BLD: 0.2 %
LDLC SERPL CALC-MCNC: 130 MG/DL (ref ?–100)
LYMPHOCYTES # BLD AUTO: 2.13 X10(3) UL (ref 1–4)
LYMPHOCYTES NFR BLD AUTO: 33.5 %
M PROTEIN MFR SERPL ELPH: 7.7 G/DL (ref 6.4–8.2)
MCH RBC QN AUTO: 23 PG (ref 26–34)
MCHC RBC AUTO-ENTMCNC: 28.4 G/DL (ref 31–37)
MCV RBC AUTO: 80.9 FL (ref 80–100)
MONOCYTES # BLD AUTO: 0.68 X10(3) UL (ref 0.1–1)
MONOCYTES NFR BLD AUTO: 10.7 %
NEUTROPHILS # BLD AUTO: 3.48 X10 (3) UL (ref 1.5–7.7)
NEUTROPHILS # BLD AUTO: 3.48 X10(3) UL (ref 1.5–7.7)
NEUTROPHILS NFR BLD AUTO: 54.6 %
NONHDLC SERPL-MCNC: 169 MG/DL (ref ?–130)
OSMOLALITY SERPL CALC.SUM OF ELEC: 303 MOSM/KG (ref 275–295)
PLATELET # BLD AUTO: 430 10(3)UL (ref 150–450)
POTASSIUM SERPL-SCNC: 4.2 MMOL/L (ref 3.5–5.1)
RBC # BLD AUTO: 4.44 X10(6)UL (ref 3.8–5.3)
SODIUM SERPL-SCNC: 144 MMOL/L (ref 136–145)
TRIGL SERPL-MCNC: 194 MG/DL (ref 30–149)
TSI SER-ACNC: 2.54 MIU/ML (ref 0.36–3.74)
VLDLC SERPL CALC-MCNC: 39 MG/DL (ref 0–30)
WBC # BLD AUTO: 6.4 X10(3) UL (ref 4–11)

## 2019-08-21 PROCEDURE — 85025 COMPLETE CBC W/AUTO DIFF WBC: CPT | Performed by: FAMILY MEDICINE

## 2019-08-21 PROCEDURE — 99213 OFFICE O/P EST LOW 20 MIN: CPT | Performed by: FAMILY MEDICINE

## 2019-08-21 PROCEDURE — 86803 HEPATITIS C AB TEST: CPT | Performed by: FAMILY MEDICINE

## 2019-08-21 PROCEDURE — 80061 LIPID PANEL: CPT | Performed by: FAMILY MEDICINE

## 2019-08-21 PROCEDURE — 84443 ASSAY THYROID STIM HORMONE: CPT | Performed by: FAMILY MEDICINE

## 2019-08-21 PROCEDURE — 36415 COLL VENOUS BLD VENIPUNCTURE: CPT | Performed by: FAMILY MEDICINE

## 2019-08-21 PROCEDURE — 80053 COMPREHEN METABOLIC PANEL: CPT | Performed by: FAMILY MEDICINE

## 2019-08-21 PROCEDURE — G0439 PPPS, SUBSEQ VISIT: HCPCS | Performed by: FAMILY MEDICINE

## 2019-08-21 NOTE — PROGRESS NOTES
[facility] [place]  PROGRESS NOTE  Chief Complaint:   Patient presents with:  Physical      HPI:   This is a 71year old female coming in for her annual Medicare wellness exam.    Abby Ignacio has had 4 falls since the start of 2019.   Virtually all o None Seen    Yeast Urine None Seen None Seen   URINE CULTURE, ROUTINE   Result Value Ref Range    Urine Culture >100,000 CFU/ML Escherichia coli (A)        Susceptibility    Escherichia coli -  (no method available)     Ampicillin <=2 Sensitive      Cefazo Dose topically 2 (two) times daily. (Patient taking differently: Apply 1 Dose topically 2 (two) times daily as needed (Use BID/PRN until clear for wound care).   ) Disp: 71 g Rfl: 1   hydrocortisone 2.5 % Rectal Cream Apply to hemorrhoids bid prn Disp: 30 g rashes, itching, skin lesion, or excessive skin dryness. CARDIOVASCULAR:  Denies chest pain, chest pressure, chest discomfort, palpitations, edema, dyspnea on exertion or at rest.  RESPIRATORY:  Denies shortness of breath, wheezing, cough or sputum.   SACHA lesion, no bruising, good turgor. HEART:  Regular rate and rhythm, no murmurs, rubs or gallops. LUNGS: Clear to auscultation bilterally, no rales/rhonchi/wheezing.   ABDOMEN:  Soft, nondistended, nontender, bowel sounds normal in all 4 quadrants, no daniela DIFFERENTIAL    4. Severe intellectual disability  She has severe intellectual disability. She is functioning fairly well considering this. - OFFICE/OUTPT VISIT,EST,LEVL III  - OCCULT BLOOD, FECAL, IMMUNOASSAY; Future  - LIPID PANEL;  Future  - HCV ANTIBO DIFFERENTIAL    10. Explosive personality disorder [facility])  Her personality disorder is actually doing a little bit better now. - OFFICE/OUTPT VISIT,EST,LEVL III    11.  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

## 2019-08-21 NOTE — TELEPHONE ENCOUNTER
----- Message from Edilma Alvarez MD sent at 8/21/2019  6:25 PM CDT -----  Please call Tamara's caregivers. Her hepatitis C test is negative. Her cholesterol is 216. This is not worrisome.   Her blood sugar is 110 but this was not a fasting blood sug

## 2019-08-21 NOTE — TELEPHONE ENCOUNTER
Information below and lab results faxed   To [facility].   Yissel Sanchez, 08/21/19, 6:32 PM

## 2019-08-21 NOTE — PATIENT INSTRUCTIONS
Please schedule mammogram at [facility].  Please schedule pneumonia vaccine. Check labs today. Recommended Websites for Advanced Directives    [email or web address]  An information packet, includ

## 2019-08-22 ENCOUNTER — TELEPHONE (OUTPATIENT)
Dept: FAMILY MEDICINE CLINIC | Facility: CLINIC | Age: 69
End: 2019-08-22

## 2019-10-02 ENCOUNTER — TELEPHONE (OUTPATIENT)
Dept: FAMILY MEDICINE CLINIC | Facility: CLINIC | Age: 69
End: 2019-10-02

## 2019-10-02 NOTE — TELEPHONE ENCOUNTER
Fax received. .  He has been doing very well with her diet. Her staff is requesting that we change the status of her diet. Plan: Please continue with thickened  liquids. Change from puréed diet to regular food diet (cut up).

## 2019-11-11 ENCOUNTER — TELEPHONE (OUTPATIENT)
Dept: FAMILY MEDICINE CLINIC | Facility: CLINIC | Age: 69
End: 2019-11-11

## 2019-11-11 NOTE — TELEPHONE ENCOUNTER
Fax received from [address] [place] [address]. Luis Antonio Hernandez has been a little bit achy in her walking. She does have occasional complaints of leg discomfort. Plan: BenGay cream to affected joints and/or muscles up to twice daily as needed.

## 2019-11-25 ENCOUNTER — TELEPHONE (OUTPATIENT)
Dept: FAMILY MEDICINE CLINIC | Facility: CLINIC | Age: 69
End: 2019-11-25

## 2019-11-25 NOTE — TELEPHONE ENCOUNTER
Fax received from [address] [place] [address]. Tamara's weight has been stable and she is doing well.   Plan: Discontinue boost.

## 2020-02-04 PROBLEM — R29.6 FREQUENT FALLS: Status: ACTIVE | Noted: 2020-02-04
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
## 2020-02-04 NOTE — PATIENT INSTRUCTIONS
Continue the same medications. Her weight has actually increased over the past 6 months. No new treatment recommended.

## 2020-02-11 RX ORDER — RANITIDINE 150 MG/1
TABLET ORAL
Qty: 31 TABLET | Refills: 11 | Status: SHIPPED | OUTPATIENT
Start: 2020-02-11 | End: 2020-04-23

## 2020-02-11 RX ORDER — DIVALPROEX SODIUM 500 MG/1
500 TABLET, DELAYED RELEASE ORAL 2 TIMES DAILY
Qty: 31 TABLET | Refills: 11 | Status: SHIPPED | OUTPATIENT
Start: 2020-02-11 | End: 2020-02-14

## 2020-02-11 NOTE — TELEPHONE ENCOUNTER
Future appt:     Your appointments     Date & Time Appointment Department [facility])    Aug 04, 2020 11:00 AM CDT Follow up - Extended with Tim Barillas MD [address] [place] [address])            [facility]

## 2020-02-14 ENCOUNTER — TELEPHONE (OUTPATIENT)
Dept: FAMILY MEDICINE CLINIC | Facility: CLINIC | Age: 70
End: 2020-02-14

## 2020-02-14 RX ORDER — DIVALPROEX SODIUM 500 MG/1
500 TABLET, DELAYED RELEASE ORAL 2 TIMES DAILY
Qty: 62 TABLET | Refills: 11 | Status: SHIPPED | OUTPATIENT
Start: 2020-02-14 | End: 2020-05-01

## 2020-02-14 NOTE — TELEPHONE ENCOUNTER
Divalproex sent to [facility] with the old dose of take with 250mg total 750mg. Patient is taking Divalproex 500mg BID. Needing New Rx sent.   Yissel Sanchez, 02/14/20, 3:14 PM

## 2020-04-23 ENCOUNTER — TELEPHONE (OUTPATIENT)
Dept: FAMILY MEDICINE CLINIC | Facility: CLINIC | Age: 70
End: 2020-04-23

## 2020-04-23 RX ORDER — FAMOTIDINE 20 MG/1
20 TABLET ORAL DAILY
Qty: 30 TABLET | Refills: 11 | Status: SHIPPED | OUTPATIENT
Start: 2020-04-23 | End: 2020-07-10

## 2020-04-23 NOTE — TELEPHONE ENCOUNTER
Ranitidine has been removed from market. Please advise alternative for Esophagitis.   Jaun Madrid, 04/23/20, 4:28 PM

## 2020-05-01 DIAGNOSIS — R56.9 CONVULSIONS, UNSPECIFIED CONVULSION TYPE (HCC): Primary | ICD-10-CM

## 2020-05-01 RX ORDER — MAGNESIUM HYDROXIDE 1200 MG/15ML
SUSPENSION ORAL
Qty: 473 ML | Refills: 11 | Status: SHIPPED | OUTPATIENT
Start: 2020-05-01

## 2020-05-01 RX ORDER — GUAIFENESIN 100 MG/5ML
LIQUID ORAL
Qty: 118 ML | Refills: 11 | Status: SHIPPED | OUTPATIENT
Start: 2020-05-01

## 2020-05-01 RX ORDER — DIVALPROEX SODIUM 500 MG/1
TABLET, DELAYED RELEASE ORAL
Qty: 62 TABLET | Refills: 11 | Status: SHIPPED | OUTPATIENT
Start: 2020-05-01 | End: 2021-04-30

## 2020-05-01 RX ORDER — DIPHENHYDRAMINE HCL 25 MG
TABLET ORAL
Qty: 50 G | Refills: 11 | Status: SHIPPED | OUTPATIENT
Start: 2020-05-01

## 2020-05-01 RX ORDER — SKIN PROTECTANT 1 G/G
OINTMENT TOPICAL
Qty: 390 G | Refills: 11 | Status: SHIPPED | OUTPATIENT
Start: 2020-05-01

## 2020-05-01 RX ORDER — DIAZEPAM 10 MG/1
TABLET ORAL
Qty: 1 TABLET | Refills: 5 | Status: SHIPPED | OUTPATIENT
Start: 2020-05-01

## 2020-05-01 RX ORDER — CAMPHOR (SYNTHETIC), MENTHOL, METHYL SALICYLATE 40; 100; 300 MG/G; MG/G; MG/G
CREAM TOPICAL
Qty: 57 G | Refills: 11 | Status: SHIPPED | OUTPATIENT
Start: 2020-05-01 | End: 2021-09-08 | Stop reason: ALTCHOICE

## 2020-05-01 RX ORDER — ACETAMINOPHEN 325 MG/1
TABLET ORAL
Qty: 124 TABLET | Refills: 11 | Status: SHIPPED | OUTPATIENT
Start: 2020-05-01 | End: 2021-04-30

## 2020-05-01 RX ORDER — DIAPER,BRIEF,INFANT-TODD,DISP
EACH MISCELLANEOUS
Qty: 28 G | Refills: 11 | Status: SHIPPED | OUTPATIENT
Start: 2020-05-01

## 2020-05-01 RX ORDER — BISMUTH SUBSALICYLATE 525 MG/15ML
LIQUID ORAL
Qty: 236 ML | Refills: 11 | Status: SHIPPED | OUTPATIENT
Start: 2020-05-01

## 2020-05-01 NOTE — TELEPHONE ENCOUNTER
Future appt:     Your appointments     Date & Time Appointment Department [facility])    Aug 04, 2020 11:00 AM CDT Follow up - Extended with Veronica Sanford MD [facility] [place] [facility]

## 2020-05-01 NOTE — TELEPHONE ENCOUNTER
Future appt:     Your appointments     Date & Time Appointment Department [facility])    Aug 04, 2020 11:00 AM CDT Follow up - Extended with Meri Angel MD [address] [place] [address])            [facility]

## 2020-05-19 ENCOUNTER — TELEPHONE (OUTPATIENT)
Dept: FAMILY MEDICINE CLINIC | Facility: CLINIC | Age: 70
End: 2020-05-19

## 2020-05-19 NOTE — TELEPHONE ENCOUNTER
Kael Snow does not need to come in for an additional appointment. Please call if there are any problems or concerns with the wound.

## 2020-05-19 NOTE — TELEPHONE ENCOUNTER
Spoke with Anayeli Zuñiga on her work cell phone of [phone] and informed her of the below instructions from Dr. [place] Mcclelland. She verbalized understanding and stated since there are no new orders she does not need the fax sent back to her.

## 2020-05-19 NOTE — TELEPHONE ENCOUNTER
Covering fax messages. Received fax from [facility] dated 05/18/20 indicating: \"On Sunday, Adrianne fell at home and had a laceration. Our nurse put on steri-strips and today we took her to [facility].   The doctor there said the steri strips wer

## 2020-05-19 NOTE — TELEPHONE ENCOUNTER
Left detailed message for Angel Gonsalez on her confidential voicemail informing her of the below and stating to call the office back if she has any further questions.

## 2020-05-20 NOTE — TELEPHONE ENCOUNTER
I called Dr Wojciech Lund back and left a message on her voice mail. Hydroxyzine Pregnancy And Lactation Text: This medication is not safe during pregnancy and should not be taken. It is also excreted in breast milk and breast feeding isn't recommended.

## 2020-05-22 RX ORDER — ACETAMINOPHEN 325 MG/1
TABLET ORAL
Qty: 60 TABLET | Refills: 10 | OUTPATIENT
Start: 2020-05-22

## 2020-06-13 ENCOUNTER — TELEPHONE (OUTPATIENT)
Dept: FAMILY MEDICINE CLINIC | Facility: CLINIC | Age: 70
End: 2020-06-13

## 2020-06-13 NOTE — TELEPHONE ENCOUNTER
Stool test for blood never completed. Please advise ok to remove order?   Symone Walters, 06/13/20, 10:36 AM

## 2020-07-09 NOTE — TELEPHONE ENCOUNTER
Please advise refills of Triple Antibiotic and Famotidine. New pharmacy needs scripts. Please see note on scripts from them.

## 2020-07-10 RX ORDER — IBUPROFEN 200 MG
CAPSULE ORAL
Qty: 28 G | Refills: 10 | Status: SHIPPED | OUTPATIENT
Start: 2020-07-10

## 2020-07-10 RX ORDER — FAMOTIDINE 20 MG/1
TABLET ORAL
Qty: 30 TABLET | Refills: 10 | Status: SHIPPED | OUTPATIENT
Start: 2020-07-10

## 2020-08-04 ENCOUNTER — APPOINTMENT (OUTPATIENT)
Dept: LAB | Age: 70
End: 2020-08-04
Attending: FAMILY MEDICINE
Payer: MEDICARE

## 2020-08-04 PROBLEM — L89.152 DECUBITUS ULCER OF SACRAL REGION, STAGE 2 (HCC): Status: RESOLVED | Noted: 2017-09-22 | Resolved: 2020-08-04

## 2020-08-04 PROBLEM — N30.01 ACUTE CYSTITIS WITH HEMATURIA: Status: RESOLVED | Noted: 2019-02-25 | Resolved: 2020-08-04

## 2020-08-04 PROCEDURE — 80053 COMPREHEN METABOLIC PANEL: CPT | Performed by: FAMILY MEDICINE

## 2020-08-04 PROCEDURE — 36415 COLL VENOUS BLD VENIPUNCTURE: CPT | Performed by: FAMILY MEDICINE

## 2020-08-04 PROCEDURE — 84443 ASSAY THYROID STIM HORMONE: CPT | Performed by: FAMILY MEDICINE

## 2020-08-04 PROCEDURE — 84550 ASSAY OF BLOOD/URIC ACID: CPT | Performed by: FAMILY MEDICINE

## 2020-08-04 PROCEDURE — 80061 LIPID PANEL: CPT | Performed by: FAMILY MEDICINE

## 2020-08-04 PROCEDURE — 85025 COMPLETE CBC W/AUTO DIFF WBC: CPT | Performed by: FAMILY MEDICINE

## 2020-08-04 PROCEDURE — 80164 ASSAY DIPROPYLACETIC ACD TOT: CPT | Performed by: FAMILY MEDICINE

## 2020-08-04 NOTE — PROGRESS NOTES
[address]  PROGRESS NOTE  Chief Complaint:   Patient presents with: Follow - Up: Avancer 6 Month Follow Up      HPI:   This is a 71year old female coming in for a 6-month follow-up.   She has not been having any new problems or concern MCH 23.0 (L) 26.0 - 34.0 pg    MCHC 28.4 (L) 31.0 - 37.0 g/dL    RDW 16.9 (H) 11.0 - 15.0 %    RDW-SD 49.1 (H) 35.1 - 46.3 fL    Neutrophil Absolute Prelim 3.48 1.50 - 7.70 x10 (3) uL    Neutrophil Absolute 3.48 1.50 - 7.70 x10(3) uL    Lymphocyte Absol DAILY (RASH) 28 g 11   • PETROLEUM JELLY External Gel - APPLY TO PERINEUM AREA EVERY NIGHT BEFORE PUTTING ON DIAPER (MOISTURE BARRIER) (9PM) 390 g 11   • PAIN RELIEVING 4-10-30 % External Cream - APPLY TOPICALLY TO SORE MUSCLES OR JOINTS TWICE DAILY AS NEE (BISMATROL) 262 MG/15ML Oral Suspension Take 30 mL by mouth As Directed.  Q4hrs/prn for Diarrhea Max 3 Days     • SILTUSSIN  MG/5ML Oral Syrup - TAKE 2 TEASPOONFULS (10ML) BY MOUTH EVERY 4 HOURS AS NEEDED (COUGH) MAX 5 DAYS THEN CALL  mL 11 Physical Exam:  GEN: She is sitting in the exam table. She did recognize me today. She answers questions yes or no. Her answers are generally appropriate to the question. She was very cooperative for the examination.   HEENT:  Head:  Normocephalic, at disorder  Her anxiety levels persist.  No new treatment recommended now. - CBC WITH DIFFERENTIAL WITH PLATELET; Future  - COMP METABOLIC PANEL (14); Future  - LIPID PANEL;  Future  - ASSAY, THYROID STIM HORMONE; Future  - URIC ACID, SERUM; Future  - VALPRO mass.  That has not caused her any long-term issues. No additional diagnostic studies have been completed. - OCCULT BLOOD, FECAL, IMMUNOASSAY; Future    14. Dental caries  She does continue with loss of teeth due to cavities.   She seems to be doing relat ovary, fallopian tube, and broad ligament     Severe intellectual disability     Convulsions ([address]     General body deterioration     Hemorrhoids, external     Adnexal mass     Bladder mass     Developmental delay     Pelvic mass     Dental caries     Freq

## 2020-08-05 ENCOUNTER — TELEPHONE (OUTPATIENT)
Dept: FAMILY MEDICINE CLINIC | Facility: CLINIC | Age: 70
End: 2020-08-05

## 2020-08-05 NOTE — TELEPHONE ENCOUNTER
Information below along with copy of labs faxed to [facility].   Edwin Nelson, 08/05/20, 8:16 AM

## 2020-08-05 NOTE — TELEPHONE ENCOUNTER
----- Message from Maria E Landry MD sent at 8/5/2020  8:05 AM CDT -----  Blood work looks good. Hemoglobin is normal at 13.   Blood sugar is normal.  Kidney function is normal.  Cholesterol is 229 which is slightly elevated but not high enough for lucy

## 2021-01-20 ENCOUNTER — OFFICE VISIT (OUTPATIENT)
Dept: FAMILY MEDICINE CLINIC | Facility: CLINIC | Age: 71
End: 2021-01-20
Payer: MEDICARE

## 2021-01-20 VITALS
OXYGEN SATURATION: 98 % | HEIGHT: 63 IN | RESPIRATION RATE: 16 BRPM | HEART RATE: 94 BPM | WEIGHT: 146.38 LBS | DIASTOLIC BLOOD PRESSURE: 80 MMHG | BODY MASS INDEX: 25.94 KG/M2 | TEMPERATURE: 98 F | SYSTOLIC BLOOD PRESSURE: 132 MMHG

## 2021-01-20 DIAGNOSIS — S90.111A CONTUSION OF RIGHT GREAT TOE WITHOUT DAMAGE TO NAIL, INITIAL ENCOUNTER: ICD-10-CM

## 2021-01-20 DIAGNOSIS — M20.41 HAMMER TOE OF RIGHT FOOT: Primary | ICD-10-CM

## 2021-01-20 PROCEDURE — 99213 OFFICE O/P EST LOW 20 MIN: CPT | Performed by: NURSE PRACTITIONER

## 2021-01-20 NOTE — PROGRESS NOTES
Lubna Marvin is a 79year old female. Patient presents with:  Ingrown Toenail: right big toe. bleeding for about a week       HPI:   Complaints of right toe bleeding for a week -   Thinks she may have hit  It - denies pain.     Caregiver states that p MUSCLES OR JOINTS TWICE DAILY AS NEEDED 57 g 11   • BISMATROL MAXIMUM STRENGTH 525 MG/15ML Oral Suspension - TAKE 2 TABLESPOONFULS (30ML) BY MOUTH EVERY 4 HOURS AS NEEDED (DIARRHEA) MAX 3 DAYS THEN CALL  mL 11   • Menthol-Zinc Oxide (CALMOSEPTINE) 0. Diarrhea Max 3 Days        Past Medical History:   Diagnosis Date   • Arthritis    • B[facility] disorder ([facility]    • Bladder mass    • Dementia ([facility]    • Depression    • Epilepsy ([facility]    • Hyperlipidemia    • Mental retardation    • Ovarian mass       Social Visit:  Requested Prescriptions      No prescriptions requested or ordered in this encounter       Imaging & Consults:  PODIATRY - EXTERNAL    No follow-ups on file.   Patient Instructions   Follow up with Dr. Wes Ghotra - for evaluation     Apply bandage as nee

## 2021-02-22 ENCOUNTER — PATIENT OUTREACH (OUTPATIENT)
Dept: FAMILY MEDICINE CLINIC | Facility: CLINIC | Age: 71
End: 2021-02-22

## 2021-02-22 NOTE — PROGRESS NOTES
Chart reviewed for care gaps. Patient has upcoming Medicare annual wellness exam.  To address age-appropriate screenings at visit.

## 2021-03-09 ENCOUNTER — OFFICE VISIT (OUTPATIENT)
Dept: FAMILY MEDICINE CLINIC | Facility: CLINIC | Age: 71
End: 2021-03-09
Payer: MEDICARE

## 2021-03-09 VITALS
RESPIRATION RATE: 20 BRPM | BODY MASS INDEX: 28 KG/M2 | SYSTOLIC BLOOD PRESSURE: 136 MMHG | HEART RATE: 80 BPM | TEMPERATURE: 99 F | WEIGHT: 156 LBS | DIASTOLIC BLOOD PRESSURE: 74 MMHG

## 2021-03-09 DIAGNOSIS — Z90.710 STATUS POST HYSTERECTOMY: ICD-10-CM

## 2021-03-09 DIAGNOSIS — R56.9 CONVULSIONS, UNSPECIFIED CONVULSION TYPE (HCC): ICD-10-CM

## 2021-03-09 DIAGNOSIS — D41.4 NEOPLASM OF UNCERTAIN BEHAVIOR OF BLADDER: ICD-10-CM

## 2021-03-09 DIAGNOSIS — Z12.11 SCREENING FOR COLON CANCER: ICD-10-CM

## 2021-03-09 DIAGNOSIS — Z00.00 ENCOUNTER FOR ANNUAL HEALTH EXAMINATION: Primary | ICD-10-CM

## 2021-03-09 DIAGNOSIS — F41.1 GENERALIZED ANXIETY DISORDER: ICD-10-CM

## 2021-03-09 DIAGNOSIS — F02.81 LATE ONSET ALZHEIMER'S DISEASE WITH BEHAVIORAL DISTURBANCE (HCC): ICD-10-CM

## 2021-03-09 DIAGNOSIS — K02.9 DENTAL CARIES: ICD-10-CM

## 2021-03-09 DIAGNOSIS — F33.1 MODERATE EPISODE OF RECURRENT MAJOR DEPRESSIVE DISORDER (HCC): ICD-10-CM

## 2021-03-09 DIAGNOSIS — F72 SEVERE INTELLECTUAL DISABILITY: ICD-10-CM

## 2021-03-09 DIAGNOSIS — R29.6 FREQUENT FALLS: ICD-10-CM

## 2021-03-09 DIAGNOSIS — L28.0 LICHENIFICATION AND LICHEN SIMPLEX CHRONICUS: ICD-10-CM

## 2021-03-09 DIAGNOSIS — G30.1 LATE ONSET ALZHEIMER'S DISEASE WITH BEHAVIORAL DISTURBANCE (HCC): ICD-10-CM

## 2021-03-09 DIAGNOSIS — R13.19 OTHER DYSPHAGIA: ICD-10-CM

## 2021-03-09 DIAGNOSIS — F31.9 BIPOLAR I DISORDER (HCC): ICD-10-CM

## 2021-03-09 DIAGNOSIS — R62.50 DEVELOPMENTAL DELAY: ICD-10-CM

## 2021-03-09 DIAGNOSIS — N32.89 BLADDER MASS: ICD-10-CM

## 2021-03-09 DIAGNOSIS — Z79.899 ENCOUNTER FOR LONG-TERM (CURRENT) DRUG USE: ICD-10-CM

## 2021-03-09 DIAGNOSIS — F60.3 EXPLOSIVE PERSONALITY DISORDER (HCC): ICD-10-CM

## 2021-03-09 DIAGNOSIS — N94.89 ADNEXAL MASS: ICD-10-CM

## 2021-03-09 PROBLEM — K64.4 HEMORRHOIDS, EXTERNAL: Status: RESOLVED | Noted: 2017-07-05 | Resolved: 2021-03-09

## 2021-03-09 PROCEDURE — G0439 PPPS, SUBSEQ VISIT: HCPCS | Performed by: FAMILY MEDICINE

## 2021-03-09 PROCEDURE — 99213 OFFICE O/P EST LOW 20 MIN: CPT | Performed by: FAMILY MEDICINE

## 2021-03-09 NOTE — PROGRESS NOTES
REASON FOR VISIT:    Yvette Bailey is a 79year old female who presents for a Medicare Annual Wellness visit. Adrianne has been doing relatively well at [employer]. She is here with her staff member Aly Thompson.     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  Q4hrs/prn for Diarrhea Max 3 Days         Glucose and HbA1c Latest Ref Rng & Units 8/4/2020 8/21/2019   Glucose 70 - 99 mg/dL 81 110(H)     Cholesterol Latest Ref Rng & Yes  Memory Problems?: Yes      Fall/Risk Assessment     Have you fallen in the last 12 months?: 0-No  Fall/Risk Scorin        Depression Screening (PHQ-2/PHQ-9): Over the LAST 2 WEEKS            Advance Directives     Do you have a healthcare power of history.   Immunization History      Immunization History  Administered            Date(s) Administered    Covid-19 Vaccine Moderna 100 mcg/0.5 ml                          01/22/2021 02/26/2021      FLU VAC High Dose 72 YRS & Older PRSV Free (38852) auscultation  CARDIO: RRR without murmur  GI: good BS's, no masses, HSM or tenderness  : deferred  RECTAL: deferred  MUSCULOSKELETAL: back is not tender, FROM of the back  EXTREMITIES: no cyanosis, clubbing or edema  NEURO: Oriented times three, cranial VISIT,EST,LEVL III    9. Generalized anxiety disorder  Her anxiety levels have been well controlled. No new treatment recommended now. - OFFICE/OUTPT VISIT,EST,LEVL III    10. Other dysphagia  Her previous dysphagia has improved significantly.   She is no counseling perfomed    SUGGESTED VACCINATIONS - Influenza, Pneumococcal, Zoster, Tetanus   Influenza: There are no preventive care reminders to display for this patient. Pneumonia: There are no preventive care reminders to display for this patient.   [employer]

## 2021-03-09 NOTE — PATIENT INSTRUCTIONS
Recommended Websites for Advanced Directives    [employer].no. org/publications/Documents/personal_dec. pdf  An information packet, including necessary form from the [address] website. http://www. [place]state. [place]/public/books/adv

## 2021-03-13 ENCOUNTER — LAB ENCOUNTER (OUTPATIENT)
Dept: LAB | Age: 71
End: 2021-03-13
Attending: FAMILY MEDICINE
Payer: MEDICARE

## 2021-03-13 DIAGNOSIS — Z12.11 SCREENING FOR COLON CANCER: ICD-10-CM

## 2021-03-13 PROCEDURE — 82274 ASSAY TEST FOR BLOOD FECAL: CPT

## 2021-03-16 LAB — HEMOCCULT STL QL: POSITIVE

## 2021-03-17 ENCOUNTER — TELEPHONE (OUTPATIENT)
Dept: FAMILY MEDICINE CLINIC | Facility: CLINIC | Age: 71
End: 2021-03-17

## 2021-03-17 DIAGNOSIS — R19.5 POSITIVE COLORECTAL CANCER SCREENING USING DNA-BASED STOOL TEST: Primary | ICD-10-CM

## 2021-03-17 NOTE — TELEPHONE ENCOUNTER
The stool fit test is positive. This means that there is an indication for colonoscopy. I will send in a referral to Dr. Ely Pollock. Please call his office to arrange a colonoscopy.

## 2021-03-17 NOTE — TELEPHONE ENCOUNTER
----- Message from Lidia Raymundo MD sent at 3/17/2021  8:14 AM CDT -----  The stool fit test is positive. This means that there is an indication for colonoscopy. I will send in a referral to Dr. Silvia Bravo.   Please call his office to arrange a colonoscop

## 2021-04-08 ENCOUNTER — TELEPHONE (OUTPATIENT)
Dept: FAMILY MEDICINE CLINIC | Facility: CLINIC | Age: 71
End: 2021-04-08

## 2021-04-08 NOTE — TELEPHONE ENCOUNTER
..Ema Spear  verbally consents to a Virtual/Telephone Check-In service on 4/8/2021. Patient understands and accepts financial responsibility for any deductible, co-insurance and/or co-pays associated with this service.     Future Appointments   Da

## 2021-04-29 DIAGNOSIS — R56.9 CONVULSIONS, UNSPECIFIED CONVULSION TYPE (HCC): ICD-10-CM

## 2021-04-30 RX ORDER — ACETAMINOPHEN 325 MG/1
TABLET ORAL
Qty: 124 TABLET | Refills: 11 | Status: SHIPPED | OUTPATIENT
Start: 2021-04-30

## 2021-04-30 RX ORDER — DIVALPROEX SODIUM 500 MG/1
TABLET, DELAYED RELEASE ORAL
Qty: 62 TABLET | Refills: 11 | Status: SHIPPED | OUTPATIENT
Start: 2021-04-30

## 2021-04-30 NOTE — TELEPHONE ENCOUNTER
Acetominophen/Divalproex:  5/1/20    Future appt:     Your appointments     Date & Time Appointment Department [facility])    Sep 08, 2021 10:30 AM CDT Video Visit with Hansel Leventhal, MD [address] [place] [address], [facility] Zahira Brunson

## 2021-09-08 NOTE — PROGRESS NOTES
[address]  PROGRESS NOTE  Chief Complaint:   Patient presents with: Follow - Up      HPI:   Benito Pham  verbally consents to a Virtual/Telephone Check-In service on 9/8/2021.     Patient understands and accepts financial responsib Smoking status: Never Smoker      Smokeless tobacco: Never Used    Vaping Use      Vaping Use: Never used    Alcohol use: No      Alcohol/week: 0.0 standard drinks    Drug use: No    Family History:  No family history on file.   Allergies:    Lithium MOUTH 1 HOUR PRIOR TO PROCEDURE/TEST (RN TO ADMINISTER) 1 tablet 5   • DULoxetine HCl 60 MG Oral Cap DR Particles Take 60 mg by mouth daily.      • Sodium Fluoride (ACT ANTICAVITY FLUORIDE RINSE) 0.05 % Mouth/Throat Solution Use as directed 1 Application in bruising. LYMPHATICS:  Denies enlarged nodes or history of splenectomy. PSYCHIATRIC: She has had increasing anxiety associated with the move to a new home. EXAM:   There were no vitals taken for this visit.  Estimated body mass index is 27.63 kg/m² a hysterectomy  She has had a hysterectomy. Refer to Dr. Lisa Marion for adjustments in medication to help with some of the behavioral issues. Her overall state of health has not declined. Plan: Please plan on returning for an annual wellness visit in March.

## 2021-09-08 NOTE — PATIENT INSTRUCTIONS
Consult Dr Estela Maxwell with changes in behavioral medications. Recheck in March 2022 for annual wellness visit.

## 2021-09-27 ENCOUNTER — TELEPHONE (OUTPATIENT)
Dept: FAMILY MEDICINE CLINIC | Facility: CLINIC | Age: 71
End: 2021-09-27

## 2021-09-27 ENCOUNTER — LAB ENCOUNTER (OUTPATIENT)
Dept: LAB | Age: 71
End: 2021-09-27
Attending: FAMILY MEDICINE
Payer: MEDICARE

## 2021-09-27 ENCOUNTER — OFFICE VISIT (OUTPATIENT)
Dept: FAMILY MEDICINE CLINIC | Facility: CLINIC | Age: 71
End: 2021-09-27
Payer: MEDICARE

## 2021-09-27 VITALS
TEMPERATURE: 96 F | DIASTOLIC BLOOD PRESSURE: 58 MMHG | RESPIRATION RATE: 16 BRPM | HEART RATE: 72 BPM | SYSTOLIC BLOOD PRESSURE: 102 MMHG

## 2021-09-27 DIAGNOSIS — G30.1 LATE ONSET ALZHEIMER'S DISEASE WITH BEHAVIORAL DISTURBANCE (HCC): ICD-10-CM

## 2021-09-27 DIAGNOSIS — R53.1 WEAKNESS: Primary | ICD-10-CM

## 2021-09-27 DIAGNOSIS — R29.6 FREQUENT FALLS: ICD-10-CM

## 2021-09-27 DIAGNOSIS — R56.9 CONVULSIONS, UNSPECIFIED CONVULSION TYPE (HCC): ICD-10-CM

## 2021-09-27 DIAGNOSIS — R53.1 WEAKNESS: ICD-10-CM

## 2021-09-27 DIAGNOSIS — F02.81 LATE ONSET ALZHEIMER'S DISEASE WITH BEHAVIORAL DISTURBANCE (HCC): ICD-10-CM

## 2021-09-27 PROBLEM — R19.5 POSITIVE FIT (FECAL IMMUNOCHEMICAL TEST): Status: RESOLVED | Noted: 2021-05-04 | Resolved: 2021-09-27

## 2021-09-27 PROBLEM — R19.5 POSITIVE FIT (FECAL IMMUNOCHEMICAL TEST): Status: ACTIVE | Noted: 2021-05-04

## 2021-09-27 LAB
ALBUMIN SERPL-MCNC: 3.3 G/DL (ref 3.4–5)
ALBUMIN/GLOB SERPL: 0.8 {RATIO} (ref 1–2)
ALP LIVER SERPL-CCNC: 100 U/L
ALT SERPL-CCNC: 15 U/L
ANION GAP SERPL CALC-SCNC: 6 MMOL/L (ref 0–18)
AST SERPL-CCNC: 8 U/L (ref 15–37)
BASOPHILS # BLD AUTO: 0.03 X10(3) UL (ref 0–0.2)
BASOPHILS NFR BLD AUTO: 0.3 %
BILIRUB SERPL-MCNC: 0.3 MG/DL (ref 0.1–2)
BILIRUB UR QL STRIP.AUTO: NEGATIVE
BUN BLD-MCNC: 29 MG/DL (ref 7–18)
CALCIUM BLD-MCNC: 9.6 MG/DL (ref 8.5–10.1)
CHLORIDE SERPL-SCNC: 117 MMOL/L (ref 98–112)
CO2 SERPL-SCNC: 25 MMOL/L (ref 21–32)
CREAT BLD-MCNC: 1.4 MG/DL
EOSINOPHIL # BLD AUTO: 0.01 X10(3) UL (ref 0–0.7)
EOSINOPHIL NFR BLD AUTO: 0.1 %
ERYTHROCYTE [DISTWIDTH] IN BLOOD BY AUTOMATED COUNT: 18.7 %
GLOBULIN PLAS-MCNC: 3.9 G/DL (ref 2.8–4.4)
GLUCOSE BLD-MCNC: 105 MG/DL (ref 70–99)
GLUCOSE UR STRIP.AUTO-MCNC: 50 MG/DL
HCT VFR BLD AUTO: 17.7 %
HGB BLD-MCNC: 4.4 G/DL
IMM GRANULOCYTES # BLD AUTO: 0.07 X10(3) UL (ref 0–1)
IMM GRANULOCYTES NFR BLD: 0.7 %
LEUKOCYTE ESTERASE UR QL STRIP.AUTO: NEGATIVE
LYMPHOCYTES # BLD AUTO: 1.82 X10(3) UL (ref 1–4)
LYMPHOCYTES NFR BLD AUTO: 18.9 %
MCH RBC QN AUTO: 16.6 PG (ref 26–34)
MCHC RBC AUTO-ENTMCNC: 24.9 G/DL (ref 31–37)
MCV RBC AUTO: 66.8 FL
MONOCYTES # BLD AUTO: 0.86 X10(3) UL (ref 0.1–1)
MONOCYTES NFR BLD AUTO: 8.9 %
NEUTROPHILS # BLD AUTO: 6.85 X10 (3) UL (ref 1.5–7.7)
NEUTROPHILS # BLD AUTO: 6.85 X10(3) UL (ref 1.5–7.7)
NEUTROPHILS NFR BLD AUTO: 71.1 %
NITRITE UR QL STRIP.AUTO: NEGATIVE
OSMOLALITY SERPL CALC.SUM OF ELEC: 312 MOSM/KG (ref 275–295)
PATIENT FASTING Y/N/NP: NO
PH UR STRIP.AUTO: 8 [PH] (ref 5–8)
PLATELET # BLD AUTO: 405 10(3)UL (ref 150–450)
POTASSIUM SERPL-SCNC: 4.1 MMOL/L (ref 3.5–5.1)
PROT SERPL-MCNC: 7.2 G/DL (ref 6.4–8.2)
PROT UR STRIP.AUTO-MCNC: >=500 MG/DL
RBC # BLD AUTO: 2.65 X10(6)UL
RBC #/AREA URNS AUTO: >10 /HPF
SODIUM SERPL-SCNC: 148 MMOL/L (ref 136–145)
SP GR UR STRIP.AUTO: 1.03 (ref 1–1.03)
UROBILINOGEN UR STRIP.AUTO-MCNC: <2 MG/DL
VALPROATE SERPL-MCNC: 101.7 UG/ML (ref 50–100)
WBC # BLD AUTO: 9.6 X10(3) UL (ref 4–11)

## 2021-09-27 PROCEDURE — 80164 ASSAY DIPROPYLACETIC ACD TOT: CPT

## 2021-09-27 PROCEDURE — 87086 URINE CULTURE/COLONY COUNT: CPT

## 2021-09-27 PROCEDURE — 85025 COMPLETE CBC W/AUTO DIFF WBC: CPT

## 2021-09-27 PROCEDURE — 80053 COMPREHEN METABOLIC PANEL: CPT

## 2021-09-27 PROCEDURE — 36415 COLL VENOUS BLD VENIPUNCTURE: CPT

## 2021-09-27 PROCEDURE — 99214 OFFICE O/P EST MOD 30 MIN: CPT | Performed by: FAMILY MEDICINE

## 2021-09-27 PROCEDURE — 81001 URINALYSIS AUTO W/SCOPE: CPT

## 2021-09-27 RX ORDER — ANORECTAL OINTMENT 15.7; .44; 24; 20.6 G/100G; G/100G; G/100G; G/100G
1 OINTMENT TOPICAL 2 TIMES DAILY PRN
COMMUNITY

## 2021-09-27 NOTE — PROGRESS NOTES
[facility] [place]  PROGRESS NOTE  Chief Complaint:   Patient presents with:  Fatigue  Fall      HPI:   This is a 70year old female coming in for evaluation.   The staff at [address] [place] [address] note that over the past several days she has been weaker than us MOUTH TWICE DAILY (PAIN) 124 tablet 11   • TRIPLE ANTIBIOTIC 3.5-400-5000 External Ointment - APPLY TOPICALLY TO WOUND TWICE DAILY AFTER CLEANING THE WOUND.  28 g 10   • FAMOTIDINE 20 MG Oral Tab - TAKE 1 TABLET BY MOUTH EVERY NIGHT AT BEDTIME (ESOPHAGITIS) REVIEW OF SYSTEMS:   CONSTITUTIONAL: See HPI. She has not had any fever or chills. EENT:  Eyes:  Denies eye pain, visual loss, blurred vision, double vision or yellow sclerae.  Ears, Nose, Throat:  Denies hearing loss, sneezing, congestion, runny nose normal. Nose: patent, no nasal discharge Throat:  No tonsillar erythema or exudate. Mouth:  No oral lesions or ulcerations, good dentition. NECK: Supple, no CLAD, no JVD, no thyromegaly. SKIN: No rashes, no skin lesion, no bruising, good turgor.   HEART: in this encounter       Health Maintenance:  Zoster Vaccines(1 of 2) Never done  DEXA Scan Never done  Mammogram due on 10/29/2020    Patient/Caregiver Education: Patient/Caregiver Education: There are no barriers to learning. Medical education done.    Out

## 2021-09-27 NOTE — TELEPHONE ENCOUNTER
Made appt for today. Answered yes to weakness on travel screening. Pt is weak, fell a couple times, poss seizure.     Future Appointments   Date Time Provider Sulema Marcelo   9/27/2021 11:30 AM Gerald Garrett MD [facility] [place] [facility]

## 2021-09-27 NOTE — TELEPHONE ENCOUNTER
Because of the emergency low hemoglobin reading, I called and spoke with the [address] [place] [address]  on-call.   They will call for an ambulance to bring her to the emergency department at [facility] because that is the this is the emergency department t

## 2021-09-27 NOTE — PATIENT INSTRUCTIONS
Check labs today, including urinalysis. No changes in medication recommended now until we see the results of the labs.

## 2021-09-28 ENCOUNTER — TELEPHONE (OUTPATIENT)
Dept: FAMILY MEDICINE CLINIC | Facility: CLINIC | Age: 71
End: 2021-09-28

## 2021-09-28 NOTE — TELEPHONE ENCOUNTER
----- Message from David Plascencia MD sent at 9/27/2021  5:28 PM CDT -----  Hemoglobin is critically low at 4.4. GFR is low at 38. Impression: Critically low hemoglobin. Plan: Phone call to the  on-call at [address].   They will arrange fo

## 2021-10-30 ENCOUNTER — TELEPHONE (OUTPATIENT)
Dept: FAMILY MEDICINE CLINIC | Facility: CLINIC | Age: 71
End: 2021-10-30

## 2021-10-30 NOTE — TELEPHONE ENCOUNTER
Susie Varner states that pt has a bladder procedure scheduled 11/8/21, wants to know if anyone could see pt for a pre-op on 11/3/21- no openings.  Would like a call back, per Susie Varner if she gets a call back today please call [phone]

## 2021-11-01 NOTE — TELEPHONE ENCOUNTER
PreOp appt given. Jesus Sheets CMA, 11/01/21, 10:45 AM    Future appt:     Your appointments     Date & Time Appointment Department [facility])    Nov 04, 2021 11:00 AM CDT Presurgical Visit with Eligio Garza MD [address] [place] [address]

## 2021-11-04 ENCOUNTER — OFFICE VISIT (OUTPATIENT)
Dept: FAMILY MEDICINE CLINIC | Facility: CLINIC | Age: 71
End: 2021-11-04
Payer: MEDICARE

## 2021-11-04 ENCOUNTER — LAB ENCOUNTER (OUTPATIENT)
Dept: LAB | Age: 71
End: 2021-11-04
Attending: FAMILY MEDICINE
Payer: MEDICARE

## 2021-11-04 VITALS
BODY MASS INDEX: 24.04 KG/M2 | RESPIRATION RATE: 16 BRPM | DIASTOLIC BLOOD PRESSURE: 60 MMHG | SYSTOLIC BLOOD PRESSURE: 86 MMHG | TEMPERATURE: 98 F | HEIGHT: 64 IN | OXYGEN SATURATION: 94 % | WEIGHT: 140.81 LBS | HEART RATE: 72 BPM

## 2021-11-04 DIAGNOSIS — C67.2 MALIGNANT NEOPLASM OF LATERAL WALL OF URINARY BLADDER (HCC): Primary | ICD-10-CM

## 2021-11-04 DIAGNOSIS — D50.0 IRON DEFICIENCY ANEMIA DUE TO CHRONIC BLOOD LOSS: ICD-10-CM

## 2021-11-04 DIAGNOSIS — G30.1 LATE ONSET ALZHEIMER'S DISEASE WITH BEHAVIORAL DISTURBANCE (HCC): ICD-10-CM

## 2021-11-04 DIAGNOSIS — F02.81 LATE ONSET ALZHEIMER'S DISEASE WITH BEHAVIORAL DISTURBANCE (HCC): ICD-10-CM

## 2021-11-04 DIAGNOSIS — Z01.818 PREOP EXAMINATION: ICD-10-CM

## 2021-11-04 DIAGNOSIS — F72 SEVERE INTELLECTUAL DISABILITY: ICD-10-CM

## 2021-11-04 PROBLEM — D49.4 BLADDER TUMOR: Status: RESOLVED | Noted: 2021-10-05 | Resolved: 2021-11-04

## 2021-11-04 PROBLEM — D49.4 BLADDER TUMOR: Status: ACTIVE | Noted: 2021-10-05

## 2021-11-04 PROBLEM — D64.9 ANEMIA: Status: ACTIVE | Noted: 2021-09-28

## 2021-11-04 PROCEDURE — 80053 COMPREHEN METABOLIC PANEL: CPT | Performed by: FAMILY MEDICINE

## 2021-11-04 PROCEDURE — 99214 OFFICE O/P EST MOD 30 MIN: CPT | Performed by: FAMILY MEDICINE

## 2021-11-04 PROCEDURE — 85025 COMPLETE CBC W/AUTO DIFF WBC: CPT | Performed by: FAMILY MEDICINE

## 2021-11-04 PROCEDURE — 36415 COLL VENOUS BLD VENIPUNCTURE: CPT | Performed by: FAMILY MEDICINE

## 2021-11-04 RX ORDER — FERROUS SULFATE 324(65)MG
324 TABLET, DELAYED RELEASE (ENTERIC COATED) ORAL
COMMUNITY
Start: 2021-10-05

## 2021-11-04 RX ORDER — FLUORIDE TOOTHPASTE
1 TOOTHPASTE DENTAL
COMMUNITY

## 2021-11-04 RX ORDER — NITROFURANTOIN 25; 75 MG/1; MG/1
100 CAPSULE ORAL 2 TIMES DAILY
COMMUNITY
Start: 2021-11-02 | End: 2021-11-09

## 2021-11-04 NOTE — PROGRESS NOTES
[facility] [place]  PROGRESS NOTE  Chief Complaint:   Patient presents with:  Pre-Op Exam      HPI:   This is a 70year old female coming in for a preoperative evaluation.   She is scheduled for cystoscopy and potential surgery to reevaluate her 12.0 - 16.0 g/dL    HCT 37.5 35.0 - 48.0 %    .0 150.0 - 450.0 10(3)uL    MCV 84.5 80.0 - 100.0 fL    MCH 23.4 (L) 26.0 - 34.0 pg    MCHC 27.7 (L) 31.0 - 37.0 g/dL    RDW 24.3 %    Neutrophil Absolute Prelim 2.68 1.50 - 7.70 x10 (3) uL    Neutrophil Tab EC DR tab TAKE 1 TABLET BY MOUTH TWICE DAILY (SEIZURE DISORDER) (8AM,5PM) 62 tablet 11   • ACETAMINOPHEN 325 MG Oral Tab TAKE 2 TABLETS (650MG) BY MOUTH TWICE DAILY (PAIN) 124 tablet 11   • TRIPLE ANTIBIOTIC 3.5-400-5000 External Ointment - APPLY TOPIC nightly. • Misc. Devices (GEL-FOAM CUSHION) Does not apply Misc 1 each by Does not apply route daily.  1 each 1      Counseling given: Not Answered       REVIEW OF SYSTEMS:   CONSTITUTIONAL: She seems to be doing pretty well since her return from the [facility] Appears stated age, well groomed. Physical Exam:  GEN: She is awake and alert. She did respond to simple commands. She seemed to recognize me when I entered the room and she squealed.   She does occasionally answer questions with grunts but it is difficu DIFFERENTIAL WITH PLATELET  - COMP METABOLIC PANEL (14)  - RBC MORPHOLOGY SCAN    3. Iron deficiency anemia due to chronic blood loss  She was diagnosed with iron deficiency anemia due to bleeding. Plan: Recheck a CBC today.   - CBC WITH DIFFERENTIAL WITH Weakness     Malignant neoplasm of lateral wall of urinary bladder (HCC)     Anemia      Destiny Woodard MD  11/4/2021  6:09 PM

## 2021-11-05 ENCOUNTER — TELEPHONE (OUTPATIENT)
Dept: FAMILY MEDICINE CLINIC | Facility: CLINIC | Age: 71
End: 2021-11-05

## 2021-11-05 NOTE — TELEPHONE ENCOUNTER
----- Message from Michelle Jo MD sent at 11/5/2021  9:08 AM CDT -----  Very good news. Hemoglobin is up to 10.4. He was down to 4.4 just before hospitalization. The rest of the labs are normal.Impression: Improvement in the hemoglobin.   Medically

## 2022-01-18 ENCOUNTER — TELEPHONE (OUTPATIENT)
Dept: FAMILY MEDICINE CLINIC | Facility: CLINIC | Age: 72
End: 2022-01-18

## 2022-01-18 NOTE — TELEPHONE ENCOUNTER
Odilon received from [address] [place] [address]. Scott Just seems to have bruises on her knees and at least once a superficial laceration. The staff is concerned that she may actually be getting up out of her wheelchair when she is unattended.   Plan: Trial of elastic knee braces

## 2022-02-03 ENCOUNTER — TELEPHONE (OUTPATIENT)
Dept: FAMILY MEDICINE CLINIC | Facility: CLINIC | Age: 72
End: 2022-02-03

## 2022-02-03 NOTE — TELEPHONE ENCOUNTER
requesting appt for Carlos Louie    looks down  and choked on something then had emesis       please advise      No future appointments.

## 2022-02-03 NOTE — TELEPHONE ENCOUNTER
Spoke with Lucie Gonzalez. States patient fell out of her wheelchair yesterday trying to maneuver. States she \"may or may not\" have any bruising. States she also received notice from the Director of patient's program that patient choked on something this morning and vomited right after. Mesha Gonzalez states patient just doesn't seem herslf today. Doesn't feel it is anything urgent to where patient needs to go to the ER she just feels patient should be \"looked at. \"  Asked if any NPs or PAs have any openings today. States patient's vitals are fine and she does not have a fever. Patient is not complaining of any SOB or chest pain.     Appt scheduled for eval.     Future Appointments   Date Time Provider Sulema Marcelo   2/3/2022  3:00 PM ISAURO Lugo

## 2022-02-04 NOTE — TELEPHONE ENCOUNTER
Fax received from [address] [place] [address]. Mary Moeller was evaluated at [facility] emergency room last evening. The testing was normal. She was sent back to her facility. She was given a prescription for Norco for pain. Labs in the emergency department record reviewed. There is not an apparent cause for the symptoms that the staff noted yesterday. Her lab work looked normal and her CT scan did not show any change from previous. At this time, I am not sure what caused her to be acting different yesterday. There is not appear to be anything that we need to change or treat at this time. As long as she is doing okay now she does not need a follow-up office visit. If she continues to have difficulty with swallowing or recurring falls please let us know.

## 2022-02-04 NOTE — TELEPHONE ENCOUNTER
Information below faxed to [facility].   Margarette Godwin CMA, 02/04/22, 11:34 AM

## 2022-02-07 ENCOUNTER — VIRTUAL PHONE E/M (OUTPATIENT)
Dept: FAMILY MEDICINE CLINIC | Facility: CLINIC | Age: 72
End: 2022-02-07
Payer: MEDICARE

## 2022-02-07 DIAGNOSIS — R29.6 FALLING EPISODES: ICD-10-CM

## 2022-02-07 DIAGNOSIS — M54.2 NECK PAIN, ACUTE: Primary | ICD-10-CM

## 2022-02-07 PROCEDURE — 99441 PHONE E/M BY PHYS 5-10 MIN: CPT | Performed by: NURSE PRACTITIONER

## 2022-02-08 ENCOUNTER — TELEPHONE (OUTPATIENT)
Dept: FAMILY MEDICINE CLINIC | Facility: CLINIC | Age: 72
End: 2022-02-08

## 2022-02-09 NOTE — TELEPHONE ENCOUNTER
Zero return call from [facility].   Narayan Sandoval CMA, 02/09/22, 4:39 PM

## 2022-02-14 NOTE — TELEPHONE ENCOUNTER
Call back from [facility]. Patient with deteriorating condition. Patient very weak. Slouches in chair. Requesting evaluation. Recent ER Visit. Appt given with Shannan Lowry on Wednesday.  informed. He will also check in on her. Future appt: Your appointments     Date & Time Appointment Department [facility])    Feb 16, 2022  9:00 AM CST Exam - Established [facility]            [id number]  [phone]        Last Appointment with provider:   11/4/2021  Last appointment at [facility]:  11/4/2021  Cholesterol, Total (mg/dL)   Date Value   08/04/2020 229 (H)     HDL Cholesterol (mg/dL)   Date Value   08/04/2020 42     LDL Cholesterol (mg/dL)   Date Value   08/04/2020 153 (H)     Triglycerides (mg/dL)   Date Value   08/04/2020 170 (H)     No results found for: EAG, A1C  Lab Results   Component Value Date    TSH 1.140 08/04/2020       No follow-ups on file.

## 2022-02-16 ENCOUNTER — LAB ENCOUNTER (OUTPATIENT)
Dept: LAB | Age: 72
End: 2022-02-16
Attending: NURSE PRACTITIONER
Payer: MEDICARE

## 2022-02-16 ENCOUNTER — OFFICE VISIT (OUTPATIENT)
Dept: FAMILY MEDICINE CLINIC | Facility: CLINIC | Age: 72
End: 2022-02-16
Payer: MEDICARE

## 2022-02-16 VITALS
SYSTOLIC BLOOD PRESSURE: 94 MMHG | OXYGEN SATURATION: 95 % | TEMPERATURE: 98 F | DIASTOLIC BLOOD PRESSURE: 58 MMHG | HEART RATE: 80 BPM | RESPIRATION RATE: 16 BRPM

## 2022-02-16 DIAGNOSIS — R74.8 ELEVATED ALKALINE PHOSPHATASE LEVEL: ICD-10-CM

## 2022-02-16 DIAGNOSIS — R53.1 GENERALIZED WEAKNESS: Primary | ICD-10-CM

## 2022-02-16 DIAGNOSIS — C67.2 MALIGNANT NEOPLASM OF LATERAL WALL OF URINARY BLADDER (HCC): ICD-10-CM

## 2022-02-16 LAB
ALBUMIN SERPL-MCNC: 3.2 G/DL (ref 3.4–5)
ALBUMIN/GLOB SERPL: 0.9 {RATIO} (ref 1–2)
ALP LIVER SERPL-CCNC: 204 U/L
ALT SERPL-CCNC: 20 U/L
ANION GAP SERPL CALC-SCNC: 2 MMOL/L (ref 0–18)
AST SERPL-CCNC: 12 U/L (ref 15–37)
BASOPHILS # BLD AUTO: 0.03 X10(3) UL (ref 0–0.2)
BASOPHILS NFR BLD AUTO: 0.5 %
BILIRUB SERPL-MCNC: 0.3 MG/DL (ref 0.1–2)
BUN BLD-MCNC: 21 MG/DL (ref 7–18)
CALCIUM BLD-MCNC: 10 MG/DL (ref 8.5–10.1)
CHLORIDE SERPL-SCNC: 112 MMOL/L (ref 98–112)
CO2 SERPL-SCNC: 32 MMOL/L (ref 21–32)
CREAT BLD-MCNC: 0.67 MG/DL
DEPRECATED HBV CORE AB SER IA-ACNC: 36 NG/ML
EOSINOPHIL # BLD AUTO: 0.02 X10(3) UL (ref 0–0.7)
EOSINOPHIL NFR BLD AUTO: 0.3 %
ERYTHROCYTE [DISTWIDTH] IN BLOOD BY AUTOMATED COUNT: 19.6 %
FASTING STATUS PATIENT QL REPORTED: NO
GGT SERPL-CCNC: 28 U/L
GLOBULIN PLAS-MCNC: 3.6 G/DL (ref 2.8–4.4)
GLUCOSE BLD-MCNC: 75 MG/DL (ref 70–99)
HCT VFR BLD AUTO: 44.7 %
HGB BLD-MCNC: 13.9 G/DL
IMM GRANULOCYTES # BLD AUTO: 0.02 X10(3) UL (ref 0–1)
IMM GRANULOCYTES NFR BLD: 0.3 %
IRON SATN MFR SERPL: 14 %
IRON SERPL-MCNC: 57 UG/DL
LYMPHOCYTES # BLD AUTO: 2.31 X10(3) UL (ref 1–4)
LYMPHOCYTES NFR BLD AUTO: 36.9 %
MCH RBC QN AUTO: 29.4 PG (ref 26–34)
MCHC RBC AUTO-ENTMCNC: 31.1 G/DL (ref 31–37)
MCV RBC AUTO: 94.5 FL
MONOCYTES # BLD AUTO: 0.55 X10(3) UL (ref 0.1–1)
MONOCYTES NFR BLD AUTO: 8.8 %
NEUTROPHILS # BLD AUTO: 3.33 X10 (3) UL (ref 1.5–7.7)
NEUTROPHILS # BLD AUTO: 3.33 X10(3) UL (ref 1.5–7.7)
NEUTROPHILS NFR BLD AUTO: 53.2 %
OSMOLALITY SERPL CALC.SUM OF ELEC: 304 MOSM/KG (ref 275–295)
PLATELET # BLD AUTO: 474 10(3)UL (ref 150–450)
POTASSIUM SERPL-SCNC: 4.8 MMOL/L (ref 3.5–5.1)
PROT SERPL-MCNC: 6.8 G/DL (ref 6.4–8.2)
RBC # BLD AUTO: 4.73 X10(6)UL
SODIUM SERPL-SCNC: 146 MMOL/L (ref 136–145)
TIBC SERPL-MCNC: 410 UG/DL (ref 240–450)
TRANSFERRIN SERPL-MCNC: 275 MG/DL (ref 200–360)
TSI SER-ACNC: 1.29 MIU/ML (ref 0.36–3.74)
VALPROATE SERPL-MCNC: 79.8 UG/ML (ref 50–100)
WBC # BLD AUTO: 6.3 X10(3) UL (ref 4–11)

## 2022-02-16 PROCEDURE — 82977 ASSAY OF GGT: CPT | Performed by: NURSE PRACTITIONER

## 2022-02-16 PROCEDURE — 83550 IRON BINDING TEST: CPT | Performed by: NURSE PRACTITIONER

## 2022-02-16 PROCEDURE — 85025 COMPLETE CBC W/AUTO DIFF WBC: CPT | Performed by: NURSE PRACTITIONER

## 2022-02-16 PROCEDURE — 80053 COMPREHEN METABOLIC PANEL: CPT | Performed by: NURSE PRACTITIONER

## 2022-02-16 PROCEDURE — 36415 COLL VENOUS BLD VENIPUNCTURE: CPT | Performed by: NURSE PRACTITIONER

## 2022-02-16 PROCEDURE — 84443 ASSAY THYROID STIM HORMONE: CPT | Performed by: NURSE PRACTITIONER

## 2022-02-16 PROCEDURE — 80164 ASSAY DIPROPYLACETIC ACD TOT: CPT | Performed by: NURSE PRACTITIONER

## 2022-02-16 PROCEDURE — 99214 OFFICE O/P EST MOD 30 MIN: CPT | Performed by: NURSE PRACTITIONER

## 2022-02-16 PROCEDURE — 83540 ASSAY OF IRON: CPT | Performed by: NURSE PRACTITIONER

## 2022-02-16 PROCEDURE — 82728 ASSAY OF FERRITIN: CPT | Performed by: NURSE PRACTITIONER

## 2022-02-18 ENCOUNTER — TELEPHONE (OUTPATIENT)
Dept: FAMILY MEDICINE CLINIC | Facility: CLINIC | Age: 72
End: 2022-02-18

## 2022-02-18 PROBLEM — R74.8 ELEVATED ALKALINE PHOSPHATASE LEVEL: Status: ACTIVE | Noted: 2022-02-18

## 2022-02-18 NOTE — PROGRESS NOTES
Brief visit in conjunction with Myrna BOX. Tori Gilmore was smiling and alert. She recognized me when I entered the room. She denied any particular pain or discomfort. Physical exam: She was alert and smiling. Lungs clear. Heart sounds regular S1-S2 without any murmur. Abdomen soft and nontender. Impression: Mild to last month with no obvious reason for it. Plan: Check labs today. If the labs come testing or treatment recommended at this time.

## 2022-02-18 NOTE — TELEPHONE ENCOUNTER
Information below along with copy of lab results   Faxed to [facility].   LAURA NORWOOD [facility], [address], 02/18/22, 1:02 PM

## 2022-02-18 NOTE — TELEPHONE ENCOUNTER
----- Message from ISAURO Lambert sent at 2/18/2022 12:13 PM CST -----  Reviewed with PCP. No significant findings on labs to explain Tamara's change in behavior and generalized weakness. Iron mildly low and out of range though this may be due to recent significant drop in hemoglobin 4 months ago which has improved and should further improve. Mild changes in some of her labs but will recheck at next visit and should not affect her generalized weakness. Normal TSH, liver enzymes improved, valproic acid in normal range. Continue to monitor and follow up in 2 weeks as scheduled. Please call Brandy Syed with recommendations/results.

## 2022-03-08 ENCOUNTER — OFFICE VISIT (OUTPATIENT)
Dept: FAMILY MEDICINE CLINIC | Facility: CLINIC | Age: 72
End: 2022-03-08
Payer: MEDICARE

## 2022-03-08 VITALS
SYSTOLIC BLOOD PRESSURE: 128 MMHG | TEMPERATURE: 98 F | OXYGEN SATURATION: 98 % | DIASTOLIC BLOOD PRESSURE: 82 MMHG | HEART RATE: 63 BPM | RESPIRATION RATE: 16 BRPM

## 2022-03-08 DIAGNOSIS — R53.1 GENERALIZED WEAKNESS: Primary | ICD-10-CM

## 2022-03-08 PROCEDURE — 99214 OFFICE O/P EST MOD 30 MIN: CPT | Performed by: NURSE PRACTITIONER

## 2022-03-08 RX ORDER — QUETIAPINE FUMARATE 300 MG/1
300 TABLET, FILM COATED ORAL NIGHTLY
Refills: 0 | COMMUNITY
Start: 2022-03-08

## 2022-03-08 NOTE — PATIENT INSTRUCTIONS
Start physical therapy through [facility] in [place]  Follow up in office in 4 weeks

## 2022-03-14 ENCOUNTER — TELEPHONE (OUTPATIENT)
Dept: FAMILY MEDICINE CLINIC | Facility: CLINIC | Age: 72
End: 2022-03-14

## 2022-03-14 NOTE — TELEPHONE ENCOUNTER
Future appt: Your appointments     Date & Time Appointment Department [facility])    May 06, 2022 10:00 AM CDT Follow Up Visit with Tae Nelson, [address] [facility], [facility]            [address] [facility], [facility] [address] [id number]  [phone]        Last Appointment with provider:   11/4/2021  [place] [facility] [place]:  3/8/2022  Cholesterol, Total (mg/dL)   Date Value   08/04/2020 229 (H)     HDL Cholesterol (mg/dL)   Date Value   08/04/2020 42     LDL Cholesterol (mg/dL)   Date Value   08/04/2020 153 (H)     Triglycerides (mg/dL)   Date Value   08/04/2020 170 (H)     No results found for: EAG, A1C  Lab Results   Component Value Date    TSH 1.290 02/16/2022       No follow-ups on file.

## 2022-03-14 NOTE — TELEPHONE ENCOUNTER
Fax received from [address] [place] [address]. Dennis Kobe His had times in the afternoon and evening where she is more agitated. She often is yelling or needs to be redirected. She has difficulty with showering and following directions. Impression: These issues sound very much like dementia. Plan no new treatment recommended now. We will re-evaluate when she returns again in May.

## 2022-04-04 ENCOUNTER — TELEPHONE (OUTPATIENT)
Dept: FAMILY MEDICINE CLINIC | Facility: CLINIC | Age: 72
End: 2022-04-04

## 2022-04-04 NOTE — TELEPHONE ENCOUNTER
2 faxes were received from [address] [place] [address] regarding Mary Lopez. The first one was a summary of her urology appointment. She will be doing 2 more cycles of BCG treatment for her bladder. The second fax was regarding a wound that she received when her shower chair tipped over. She has a long scrape on her back. The staff notes that the scrape is long but does not appear to be infected at this time. Plan: Recommend no treatment of the scrape at this time. Please let us know if it does begin to get infected.

## 2022-04-04 NOTE — TELEPHONE ENCOUNTER
Having trouble with fax machine / would like to make sure fax sent over today regarding scrape on patient was received.

## 2022-04-04 NOTE — TELEPHONE ENCOUNTER
Please advise information received from   [facility].   Mariaelena Nolasco CMA, 04/04/22, 3:45 PM

## 2022-04-04 NOTE — TELEPHONE ENCOUNTER
Information below faxed to [facility].   Sera Luna CMA, 04/04/22, 4:04 PM

## 2022-04-07 ENCOUNTER — TELEPHONE (OUTPATIENT)
Dept: FAMILY MEDICINE CLINIC | Facility: CLINIC | Age: 72
End: 2022-04-07

## 2022-04-07 NOTE — TELEPHONE ENCOUNTER
Noemi Padilla was here for appointment for different patient and showed this provider a photo of the scrape along the patient's mid back. Per Noemi Padilla the prescription was approximately 8 cm in length extending vertically along the patient's mid back. She reports it is becoming more red and a little drainage compared to prior, site does appear reddened along the laceration site, difficult to assess drainage from photo. They are not doing any treatment at this time and just monitoring with routine hygiene. I recommend at this time to wash with Dial soap twice a day and pat dry. Apply large quantity of triple antibiotic ointment along the injury site. Monitor for any worsening redness or drainage or patient develops fever or extending redness. After speaking with Noemi Padilla please forward to Dr Lakisha Diaz for review/update when returns tomorrow.

## 2022-04-07 NOTE — TELEPHONE ENCOUNTER
Fax received from April Carty; The scrape is not looking better, it has puss. Will bring image with to appointment today for another patient.

## 2022-04-07 NOTE — TELEPHONE ENCOUNTER
Spoke to Rin Aguilera advised of  recommendations below. Faxed note also indicating apply the abx ointment bid, cover with gauze, and secure with paper tape. - Ok per . Forwarded to Dr. Tj Zepeda for review.

## 2022-04-08 NOTE — TELEPHONE ENCOUNTER
Agree with the recommendation to do local treatment for the wound. If there are additional concerns, please come into the office for evaluation.

## 2022-05-06 ENCOUNTER — OFFICE VISIT (OUTPATIENT)
Dept: FAMILY MEDICINE CLINIC | Facility: CLINIC | Age: 72
End: 2022-05-06
Payer: MEDICARE

## 2022-05-06 VITALS
DIASTOLIC BLOOD PRESSURE: 82 MMHG | TEMPERATURE: 97 F | OXYGEN SATURATION: 95 % | SYSTOLIC BLOOD PRESSURE: 110 MMHG | HEART RATE: 66 BPM | RESPIRATION RATE: 16 BRPM

## 2022-05-06 DIAGNOSIS — S01.511D LIP LACERATION, SUBSEQUENT ENCOUNTER: ICD-10-CM
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
## 2022-05-06 DIAGNOSIS — R53.1 GENERALIZED WEAKNESS: Primary | ICD-10-CM

## 2022-05-06 PROCEDURE — 99214 OFFICE O/P EST MOD 30 MIN: CPT | Performed by: NURSE PRACTITIONER

## 2022-05-06 NOTE — PATIENT INSTRUCTIONS
Continue with Physical therapy. Continue with safety precautions, home exercises as outlined by PT. If ongoing generalized weakness, reoccurring fall, or balance changes over the next few weeks despite therapy, reach out to Psychiatry, Dr. Jas Matias, regarding potential medication contribution to symptoms. Monitor wound sites.   Ice to bruising areas, monitor suture site for signs of infection, keep clean

## 2022-08-05 ENCOUNTER — OFFICE VISIT (OUTPATIENT)
Dept: FAMILY MEDICINE CLINIC | Facility: CLINIC | Age: 72
End: 2022-08-05
Payer: MEDICARE

## 2022-08-05 VITALS
HEART RATE: 51 BPM | SYSTOLIC BLOOD PRESSURE: 108 MMHG | BODY MASS INDEX: 21.36 KG/M2 | DIASTOLIC BLOOD PRESSURE: 72 MMHG | RESPIRATION RATE: 18 BRPM | OXYGEN SATURATION: 99 % | WEIGHT: 128.19 LBS | HEIGHT: 65 IN | TEMPERATURE: 97 F

## 2022-08-05 DIAGNOSIS — F60.3 EXPLOSIVE PERSONALITY DISORDER (HCC): ICD-10-CM

## 2022-08-05 DIAGNOSIS — R62.50 DEVELOPMENTAL DELAY: ICD-10-CM

## 2022-08-05 DIAGNOSIS — R13.19 OTHER DYSPHAGIA: ICD-10-CM

## 2022-08-05 DIAGNOSIS — R56.9 CONVULSIONS, UNSPECIFIED CONVULSION TYPE (HCC): ICD-10-CM

## 2022-08-05 DIAGNOSIS — N94.89 ADNEXAL MASS: ICD-10-CM

## 2022-08-05 DIAGNOSIS — R29.6 FREQUENT FALLS: ICD-10-CM

## 2022-08-05 DIAGNOSIS — L28.0 LICHENIFICATION AND LICHEN SIMPLEX CHRONICUS: ICD-10-CM

## 2022-08-05 DIAGNOSIS — R53.1 WEAKNESS: ICD-10-CM

## 2022-08-05 DIAGNOSIS — F41.1 GENERALIZED ANXIETY DISORDER: ICD-10-CM

## 2022-08-05 DIAGNOSIS — F31.9 BIPOLAR I DISORDER (HCC): ICD-10-CM

## 2022-08-05 DIAGNOSIS — Z79.899 ENCOUNTER FOR LONG-TERM (CURRENT) DRUG USE: ICD-10-CM

## 2022-08-05 DIAGNOSIS — F72 SEVERE INTELLECTUAL DISABILITY: ICD-10-CM

## 2022-08-05 DIAGNOSIS — Z00.00 ENCOUNTER FOR ANNUAL HEALTH EXAMINATION: Primary | ICD-10-CM

## 2022-08-05 DIAGNOSIS — Z90.710 STATUS POST HYSTERECTOMY: ICD-10-CM

## 2022-08-05 DIAGNOSIS — F02.81 LATE ONSET ALZHEIMER'S DISEASE WITH BEHAVIORAL DISTURBANCE (HCC): ICD-10-CM

## 2022-08-05 DIAGNOSIS — F33.1 MODERATE EPISODE OF RECURRENT MAJOR DEPRESSIVE DISORDER (HCC): ICD-10-CM

## 2022-08-05 DIAGNOSIS — C67.2 MALIGNANT NEOPLASM OF LATERAL WALL OF URINARY BLADDER (HCC): ICD-10-CM

## 2022-08-05 DIAGNOSIS — K02.9 DENTAL CARIES: ICD-10-CM

## 2022-08-05 DIAGNOSIS — G30.1 LATE ONSET ALZHEIMER'S DISEASE WITH BEHAVIORAL DISTURBANCE (HCC): ICD-10-CM

## 2022-08-05 PROBLEM — R74.8 ELEVATED ALKALINE PHOSPHATASE LEVEL: Status: RESOLVED | Noted: 2022-02-18 | Resolved: 2022-08-05

## 2022-08-05 PROBLEM — Z01.818 PREOP EXAMINATION: Status: RESOLVED | Noted: 2017-08-09 | Resolved: 2022-08-05

## 2022-08-05 PROBLEM — D64.9 ANEMIA: Status: RESOLVED | Noted: 2021-09-28 | Resolved: 2022-08-05

## 2022-08-05 PROCEDURE — G0439 PPPS, SUBSEQ VISIT: HCPCS | Performed by: FAMILY MEDICINE

## 2022-08-05 PROCEDURE — 99213 OFFICE O/P EST LOW 20 MIN: CPT | Performed by: FAMILY MEDICINE

## 2022-08-05 NOTE — PATIENT INSTRUCTIONS
Recommend a psychiatric review to see if there is any medication changes that would help with the personality changes later in the evening. No other medication changes recommended today. Recommend consideration for the Prevnar 23 vaccine as well as 2 doses of the Shingrix vaccine.

## 2022-09-23 ENCOUNTER — TELEMEDICINE (OUTPATIENT)
Dept: FAMILY MEDICINE CLINIC | Facility: CLINIC | Age: 72
End: 2022-09-23

## 2022-09-23 DIAGNOSIS — L03.311 CELLULITIS OF ABDOMINAL WALL: Primary | ICD-10-CM

## 2022-09-23 PROCEDURE — 99213 OFFICE O/P EST LOW 20 MIN: CPT | Performed by: NURSE PRACTITIONER

## 2022-09-23 RX ORDER — CEPHALEXIN 250 MG/5ML
500 POWDER, FOR SUSPENSION ORAL 3 TIMES DAILY
Qty: 300 ML | Refills: 0 | Status: SHIPPED | OUTPATIENT
Start: 2022-09-23 | End: 2022-10-03

## 2022-09-23 NOTE — PATIENT INSTRUCTIONS
Cephalexin 500mg liquid three times daily for ten days. Ice to site three times a day at times of antibiotic dosing for 10-15 minutes at a time. Site marked during visit, monitor for spreading/drainage/fever/worsening  Keep site clean, dial soap, pat dry  Avoid scratching as able  If worsening/severe over weekend then ER.    Send update Monday regarding site

## 2022-09-26 ENCOUNTER — TELEPHONE (OUTPATIENT)
Dept: FAMILY MEDICINE CLINIC | Facility: CLINIC | Age: 72
End: 2022-09-26

## 2023-04-03 ENCOUNTER — TELEPHONE (OUTPATIENT)
Dept: FAMILY MEDICINE CLINIC | Facility: CLINIC | Age: 73
End: 2023-04-03

## 2023-04-03 NOTE — TELEPHONE ENCOUNTER
Odilon received from [address] [place] [address]. Krish Crawford on March 30 and was seen in the emergency department at [facility].  She had a wound that required several sutures. The sutures were the absorbable type. Impression: Fall with a laceration that is healing. Plan: No additional testing or treatment recommended now. She does not need a follow-up visit in our office.

## 2023-04-26 DIAGNOSIS — R56.9 CONVULSIONS, UNSPECIFIED CONVULSION TYPE (HCC): ICD-10-CM

## 2023-04-26 RX ORDER — FERROUS SULFATE 324(65)MG
324 TABLET, DELAYED RELEASE (ENTERIC COATED) ORAL
Qty: 30 TABLET | Refills: 11 | Status: SHIPPED | OUTPATIENT
Start: 2023-04-26

## 2023-04-26 RX ORDER — ACETAMINOPHEN 325 MG/1
650 TABLET ORAL 2 TIMES DAILY
Qty: 124 TABLET | Refills: 11 | Status: SHIPPED | OUTPATIENT
Start: 2023-04-26

## 2023-04-26 RX ORDER — FAMOTIDINE 20 MG/1
TABLET, FILM COATED ORAL
Qty: 30 TABLET | Refills: 11 | Status: SHIPPED | OUTPATIENT
Start: 2023-04-26

## 2023-04-26 RX ORDER — DIVALPROEX SODIUM 500 MG/1
500 TABLET, DELAYED RELEASE ORAL 2 TIMES DAILY
Qty: 62 TABLET | Refills: 11 | Status: SHIPPED | OUTPATIENT
Start: 2023-04-26

## 2023-04-26 NOTE — TELEPHONE ENCOUNTER
Ferrous Sulfate/Acetaminophen/  Famotidine/Divalproex    Future appt:    Last Appointment with provider: 8/5/22  Last appointment at [facility] [place] [place]:  Visit date not found  Cholesterol, Total (mg/dL)   Date Value   08/04/2020 229 (H)     HDL Cholesterol (mg/dL)   Date Value   08/04/2020 42     LDL Cholesterol (mg/dL)   Date Value   08/04/2020 153 (H)     Triglycerides (mg/dL)   Date Value   08/04/2020 170 (H)     No results found for: EAG, A1C  Lab Results   Component Value Date    TSH 1.290 02/16/2022       No follow-ups on file.

## 2023-08-15 ENCOUNTER — LAB ENCOUNTER (OUTPATIENT)
Dept: LAB | Age: 73
End: 2023-08-15
Attending: FAMILY MEDICINE
Payer: MEDICARE

## 2023-08-15 ENCOUNTER — TELEPHONE (OUTPATIENT)
Dept: FAMILY MEDICINE CLINIC | Facility: CLINIC | Age: 73
End: 2023-08-15

## 2023-08-15 ENCOUNTER — OFFICE VISIT (OUTPATIENT)
Dept: FAMILY MEDICINE CLINIC | Facility: CLINIC | Age: 73
End: 2023-08-15
Payer: MEDICARE

## 2023-08-15 VITALS
DIASTOLIC BLOOD PRESSURE: 78 MMHG | HEART RATE: 77 BPM | RESPIRATION RATE: 18 BRPM | TEMPERATURE: 97 F | WEIGHT: 140.38 LBS | BODY MASS INDEX: 23 KG/M2 | OXYGEN SATURATION: 100 % | SYSTOLIC BLOOD PRESSURE: 124 MMHG

## 2023-08-15 DIAGNOSIS — F31.9 BIPOLAR I DISORDER (HCC): ICD-10-CM

## 2023-08-15 DIAGNOSIS — G30.1 LATE ONSET ALZHEIMER'S DISEASE WITH BEHAVIORAL DISTURBANCE (HCC): ICD-10-CM

## 2023-08-15 DIAGNOSIS — F41.1 GENERALIZED ANXIETY DISORDER: ICD-10-CM

## 2023-08-15 DIAGNOSIS — Z00.00 ENCOUNTER FOR ANNUAL HEALTH EXAMINATION: ICD-10-CM

## 2023-08-15 DIAGNOSIS — F72 SEVERE INTELLECTUAL DISABILITY: ICD-10-CM

## 2023-08-15 DIAGNOSIS — H61.23 BILATERAL IMPACTED CERUMEN: ICD-10-CM

## 2023-08-15 DIAGNOSIS — Z00.00 ENCOUNTER FOR MEDICARE ANNUAL WELLNESS EXAM: Primary | ICD-10-CM

## 2023-08-15 DIAGNOSIS — Z13.6 SCREENING FOR CARDIOVASCULAR CONDITION: ICD-10-CM

## 2023-08-15 DIAGNOSIS — R56.9 CONVULSIONS, UNSPECIFIED CONVULSION TYPE (HCC): ICD-10-CM

## 2023-08-15 DIAGNOSIS — F02.818 LATE ONSET ALZHEIMER'S DISEASE WITH BEHAVIORAL DISTURBANCE (HCC): ICD-10-CM

## 2023-08-15 PROBLEM — R29.6 FREQUENT FALLS: Status: RESOLVED | Noted: 2020-02-04 | Resolved: 2023-08-15

## 2023-08-15 LAB
ALBUMIN SERPL-MCNC: 3.5 G/DL (ref 3.4–5)
ALBUMIN/GLOB SERPL: 0.9 {RATIO} (ref 1–2)
ALP LIVER SERPL-CCNC: 93 U/L
ALT SERPL-CCNC: 19 U/L
ANION GAP SERPL CALC-SCNC: 5 MMOL/L (ref 0–18)
AST SERPL-CCNC: 10 U/L (ref 15–37)
BASOPHILS # BLD AUTO: 0.03 X10(3) UL (ref 0–0.2)
BASOPHILS NFR BLD AUTO: 0.5 %
BILIRUB SERPL-MCNC: 0.2 MG/DL (ref 0.1–2)
BUN BLD-MCNC: 28 MG/DL (ref 7–18)
CALCIUM BLD-MCNC: 9.7 MG/DL (ref 8.5–10.1)
CHLORIDE SERPL-SCNC: 108 MMOL/L (ref 98–112)
CHOLEST SERPL-MCNC: 226 MG/DL (ref ?–200)
CO2 SERPL-SCNC: 28 MMOL/L (ref 21–32)
CREAT BLD-MCNC: 0.77 MG/DL
EGFRCR SERPLBLD CKD-EPI 2021: 81 ML/MIN/1.73M2 (ref 60–?)
EOSINOPHIL # BLD AUTO: 0.07 X10(3) UL (ref 0–0.7)
EOSINOPHIL NFR BLD AUTO: 1.2 %
ERYTHROCYTE [DISTWIDTH] IN BLOOD BY AUTOMATED COUNT: 13.2 %
GLOBULIN PLAS-MCNC: 4.1 G/DL (ref 2.8–4.4)
GLUCOSE BLD-MCNC: 92 MG/DL (ref 70–99)
HCT VFR BLD AUTO: 43 %
HDLC SERPL-MCNC: 39 MG/DL (ref 40–59)
HGB BLD-MCNC: 13.8 G/DL
IMM GRANULOCYTES # BLD AUTO: 0.02 X10(3) UL (ref 0–1)
IMM GRANULOCYTES NFR BLD: 0.3 %
LDLC SERPL CALC-MCNC: 140 MG/DL (ref ?–100)
LYMPHOCYTES # BLD AUTO: 2.32 X10(3) UL (ref 1–4)
LYMPHOCYTES NFR BLD AUTO: 38.3 %
MCH RBC QN AUTO: 30.6 PG (ref 26–34)
MCHC RBC AUTO-ENTMCNC: 32.1 G/DL (ref 31–37)
MCV RBC AUTO: 95.3 FL
MONOCYTES # BLD AUTO: 0.69 X10(3) UL (ref 0.1–1)
MONOCYTES NFR BLD AUTO: 11.4 %
NEUTROPHILS # BLD AUTO: 2.93 X10 (3) UL (ref 1.5–7.7)
NEUTROPHILS # BLD AUTO: 2.93 X10(3) UL (ref 1.5–7.7)
NEUTROPHILS NFR BLD AUTO: 48.3 %
NONHDLC SERPL-MCNC: 187 MG/DL (ref ?–130)
OSMOLALITY SERPL CALC.SUM OF ELEC: 297 MOSM/KG (ref 275–295)
PLATELET # BLD AUTO: 273 10(3)UL (ref 150–450)
POTASSIUM SERPL-SCNC: 3.9 MMOL/L (ref 3.5–5.1)
PROT SERPL-MCNC: 7.6 G/DL (ref 6.4–8.2)
RBC # BLD AUTO: 4.51 X10(6)UL
SODIUM SERPL-SCNC: 141 MMOL/L (ref 136–145)
TRIGL SERPL-MCNC: 260 MG/DL (ref 30–149)
TSI SER-ACNC: 2.31 MIU/ML (ref 0.36–3.74)
VIT B12 SERPL-MCNC: 451 PG/ML (ref 193–986)
VLDLC SERPL CALC-MCNC: 49 MG/DL (ref 0–30)
WBC # BLD AUTO: 6.1 X10(3) UL (ref 4–11)

## 2023-08-15 PROCEDURE — 1126F AMNT PAIN NOTED NONE PRSNT: CPT | Performed by: FAMILY MEDICINE

## 2023-08-15 PROCEDURE — 80061 LIPID PANEL: CPT

## 2023-08-15 PROCEDURE — G0439 PPPS, SUBSEQ VISIT: HCPCS | Performed by: FAMILY MEDICINE

## 2023-08-15 PROCEDURE — 85025 COMPLETE CBC W/AUTO DIFF WBC: CPT

## 2023-08-15 PROCEDURE — 80053 COMPREHEN METABOLIC PANEL: CPT

## 2023-08-15 PROCEDURE — 84443 ASSAY THYROID STIM HORMONE: CPT

## 2023-08-15 PROCEDURE — 36415 COLL VENOUS BLD VENIPUNCTURE: CPT

## 2023-08-15 PROCEDURE — 82607 VITAMIN B-12: CPT

## 2023-08-15 PROCEDURE — 99213 OFFICE O/P EST LOW 20 MIN: CPT | Performed by: FAMILY MEDICINE

## 2023-08-15 RX ORDER — ANORECTAL OINTMENT 15.7; .44; 24; 20.6 G/100G; G/100G; G/100G; G/100G
1 OINTMENT TOPICAL 2 TIMES DAILY PRN
Qty: 100 G | Refills: 5 | Status: SHIPPED | OUTPATIENT
Start: 2023-08-15

## 2023-08-15 RX ORDER — SKIN PROTECTANT 1 G/G
OINTMENT TOPICAL
Qty: 390 G | Refills: 11 | Status: SHIPPED | OUTPATIENT
Start: 2023-08-15

## 2023-08-15 NOTE — TELEPHONE ENCOUNTER
States that they received refill request for calmoseptine with different directions. States that past directions were to \"apply topically to affected area between buttocks/ lower back 2 x a day PRN protectant- call RN\" wants to know if Dr wants to keep those directions. If yes, please send request with those directions.

## 2024-08-08 NOTE — TELEPHONE ENCOUNTER
Information below faxed to [facility].   Janay Mcintosh CMA, 03/14/22, 11:26 AM normal for race

## (undated) NOTE — LETTER
10/21/20        [address] [place] [id number]      Dear Ilir Carter records indicate that you have outstanding lab work and or testing that was ordered for you and has not yet been completed:  Orders Placed This Encounter

## (undated) NOTE — MR AVS SNAPSHOT
[address]  [phone]  [phone]               Thank you for choosing us for your health care visit with Trena Rome MD.  We are glad to serve you and happy to provide you with this summary o Generic drug:  rivastigmine   Apply 1 patch topically daily. GUIATUSS 100 MG/5ML Syrp   Generic drug:  guaiFENesin   Take 10 mL by mouth As Directed.  10ml po Q4hrs/prn for Cough (max 5 days)           hydrocortisone 1 % Crea   Apply 1 Application dairy products with reduced content of saturated and total fat.    Dietary sodium reduction Reduce dietary sodium intake to <= 100 mmol per day (2.4 g sodium or 6 g sodium chloride)   Aerobic physical activity Regular aerobic physical activity (e.g., brisk Eat plenty of protein, keep the fat content low Sugars:  sodas and sports drinks, candies and desserts   Eat plenty of low-fat dairy products High fat meats and dairy   Choose whole grain products Foods high in sodium   Water is best for hydration Fast chaya